# Patient Record
Sex: MALE | Race: WHITE | Employment: FULL TIME | URBAN - NONMETROPOLITAN AREA
[De-identification: names, ages, dates, MRNs, and addresses within clinical notes are randomized per-mention and may not be internally consistent; named-entity substitution may affect disease eponyms.]

---

## 2019-12-06 ENCOUNTER — HOSPITAL ENCOUNTER (EMERGENCY)
Age: 24
Discharge: HOME | End: 2019-12-06
Attending: FAMILY MEDICINE
Payer: COMMERCIAL

## 2019-12-06 ENCOUNTER — APPOINTMENT (OUTPATIENT)
Dept: GENERAL RADIOLOGY | Age: 24
End: 2019-12-06
Payer: COMMERCIAL

## 2019-12-06 VITALS
TEMPERATURE: 98.7 F | HEART RATE: 78 BPM | HEIGHT: 73 IN | RESPIRATION RATE: 18 BRPM | DIASTOLIC BLOOD PRESSURE: 80 MMHG | WEIGHT: 205 LBS | BODY MASS INDEX: 27.17 KG/M2 | OXYGEN SATURATION: 100 % | SYSTOLIC BLOOD PRESSURE: 130 MMHG

## 2019-12-06 DIAGNOSIS — S92.314A CLOSED NONDISPLACED FRACTURE OF FIRST METATARSAL BONE OF RIGHT FOOT, INITIAL ENCOUNTER: Primary | ICD-10-CM

## 2019-12-06 PROCEDURE — 99283 EMERGENCY DEPT VISIT LOW MDM: CPT

## 2019-12-06 PROCEDURE — 2709999900 HC NON-CHARGEABLE SUPPLY

## 2019-12-06 PROCEDURE — 73630 X-RAY EXAM OF FOOT: CPT

## 2019-12-06 PROCEDURE — 6370000000 HC RX 637 (ALT 250 FOR IP): Performed by: FAMILY MEDICINE

## 2019-12-06 PROCEDURE — 29515 APPLICATION SHORT LEG SPLINT: CPT

## 2019-12-06 RX ORDER — HYDROCODONE BITARTRATE AND ACETAMINOPHEN 5; 325 MG/1; MG/1
1 TABLET ORAL EVERY 6 HOURS PRN
Qty: 15 TABLET | Refills: 0 | Status: SHIPPED | OUTPATIENT
Start: 2019-12-06 | End: 2019-12-09

## 2019-12-06 RX ORDER — HYDROCODONE BITARTRATE AND ACETAMINOPHEN 5; 325 MG/1; MG/1
1 TABLET ORAL ONCE
Status: COMPLETED | OUTPATIENT
Start: 2019-12-06 | End: 2019-12-06

## 2019-12-06 RX ADMIN — HYDROCODONE BITARTRATE AND ACETAMINOPHEN 1 TABLET: 5; 325 TABLET ORAL at 15:37

## 2019-12-06 ASSESSMENT — ENCOUNTER SYMPTOMS
BACK PAIN: 0
ABDOMINAL PAIN: 0
WHEEZING: 0
SHORTNESS OF BREATH: 0
SORE THROAT: 0
COLOR CHANGE: 1
DIARRHEA: 0
COUGH: 0
NAUSEA: 0
VOMITING: 0

## 2019-12-06 ASSESSMENT — PAIN SCALES - GENERAL
PAINLEVEL_OUTOF10: 8
PAINLEVEL_OUTOF10: 8

## 2019-12-06 ASSESSMENT — PAIN DESCRIPTION - PAIN TYPE: TYPE: ACUTE PAIN

## 2019-12-06 ASSESSMENT — PAIN DESCRIPTION - ORIENTATION: ORIENTATION: RIGHT

## 2019-12-06 ASSESSMENT — PAIN DESCRIPTION - LOCATION: LOCATION: FOOT

## 2019-12-17 ENCOUNTER — HOSPITAL ENCOUNTER (OUTPATIENT)
Dept: GENERAL RADIOLOGY | Age: 24
Discharge: HOME | End: 2019-12-17
Payer: COMMERCIAL

## 2019-12-17 ENCOUNTER — HOSPITAL ENCOUNTER (OUTPATIENT)
Age: 24
Discharge: HOME | End: 2019-12-17
Payer: COMMERCIAL

## 2019-12-17 DIAGNOSIS — M79.671 RIGHT FOOT PAIN: ICD-10-CM

## 2019-12-17 PROCEDURE — 73630 X-RAY EXAM OF FOOT: CPT

## 2025-06-18 ENCOUNTER — ANESTHESIA (OUTPATIENT)
Dept: OPERATING ROOM | Age: 30
End: 2025-06-18
Payer: COMMERCIAL

## 2025-06-18 ENCOUNTER — HOSPITAL ENCOUNTER (INPATIENT)
Age: 30
LOS: 3 days | Discharge: HOME | End: 2025-06-21
Attending: INTERNAL MEDICINE | Admitting: NURSE PRACTITIONER
Payer: COMMERCIAL

## 2025-06-18 ENCOUNTER — APPOINTMENT (OUTPATIENT)
Dept: CT IMAGING | Age: 30
End: 2025-06-18
Payer: COMMERCIAL

## 2025-06-18 ENCOUNTER — APPOINTMENT (OUTPATIENT)
Dept: GENERAL RADIOLOGY | Age: 30
End: 2025-06-18
Payer: COMMERCIAL

## 2025-06-18 ENCOUNTER — ANESTHESIA EVENT (OUTPATIENT)
Dept: OPERATING ROOM | Age: 30
End: 2025-06-18
Payer: COMMERCIAL

## 2025-06-18 DIAGNOSIS — J36 TONSIL, ABSCESS: ICD-10-CM

## 2025-06-18 DIAGNOSIS — L02.11 ABSCESS OF MULTIPLE SITES OF HEAD AND NECK: ICD-10-CM

## 2025-06-18 DIAGNOSIS — L02.811 ABSCESS OF MULTIPLE SITES OF HEAD AND NECK: ICD-10-CM

## 2025-06-18 DIAGNOSIS — J36 TONSILLAR ABSCESS: Primary | ICD-10-CM

## 2025-06-18 PROBLEM — Z99.11: Status: ACTIVE | Noted: 2025-06-18

## 2025-06-18 LAB
ANION GAP SERPL CALC-SCNC: 14 MEQ/L (ref 8–16)
APTT PPP: 33.1 SECONDS (ref 22–38)
BASOPHILS ABSOLUTE: 0 THOU/MM3 (ref 0–0.1)
BASOPHILS NFR BLD AUTO: 0.2 %
BUN SERPL-MCNC: 12 MG/DL (ref 8–23)
CALCIUM SERPL-MCNC: 10.1 MG/DL (ref 8.6–10)
CHLORIDE SERPL-SCNC: 101 MEQ/L (ref 98–111)
CO2 SERPL-SCNC: 23 MEQ/L (ref 22–29)
CREAT SERPL-MCNC: 0.7 MG/DL (ref 0.7–1.2)
CRP SERPL-MCNC: 11 MG/DL (ref 0–0.5)
CRP SERPL-MCNC: 9.32 MG/DL (ref 0–0.5)
DEPRECATED RDW RBC AUTO: 41.1 FL (ref 35–45)
EKG ATRIAL RATE: 92 BPM
EKG P AXIS: 77 DEGREES
EKG P-R INTERVAL: 130 MS
EKG Q-T INTERVAL: 332 MS
EKG QRS DURATION: 86 MS
EKG QTC CALCULATION (BAZETT): 410 MS
EKG R AXIS: 58 DEGREES
EKG T AXIS: 32 DEGREES
EKG VENTRICULAR RATE: 92 BPM
EOSINOPHIL NFR BLD AUTO: 0.2 %
EOSINOPHILS ABSOLUTE: 0 THOU/MM3 (ref 0–0.4)
ERYTHROCYTE [DISTWIDTH] IN BLOOD BY AUTOMATED COUNT: 12.4 % (ref 11.5–14.5)
ERYTHROCYTE [SEDIMENTATION RATE] IN BLOOD BY WESTERGREN METHOD: 37 MM/HR (ref 0–20)
GFR SERPL CREATININE-BSD FRML MDRD: > 90 ML/MIN/1.73M2
GLUCOSE SERPL-MCNC: 96 MG/DL (ref 74–109)
HCT VFR BLD AUTO: 42.7 % (ref 42–52)
HGB BLD-MCNC: 15 GM/DL (ref 14–18)
IMM GRANULOCYTES # BLD AUTO: 0.03 THOU/MM3 (ref 0–0.07)
IMM GRANULOCYTES NFR BLD AUTO: 0.3 %
LACTIC ACID, SEPSIS: 0.6 MMOL/L (ref 0.5–1.9)
LACTIC ACID, SEPSIS: 0.8 MMOL/L (ref 0.5–1.9)
LYMPHOCYTES ABSOLUTE: 1.7 THOU/MM3 (ref 1–4.8)
LYMPHOCYTES NFR BLD AUTO: 18.1 %
MCH RBC QN AUTO: 32.1 PG (ref 26–33)
MCHC RBC AUTO-ENTMCNC: 35.1 GM/DL (ref 32.2–35.5)
MCV RBC AUTO: 91.4 FL (ref 80–94)
MONOCYTES ABSOLUTE: 0.6 THOU/MM3 (ref 0.4–1.3)
MONOCYTES NFR BLD AUTO: 6.9 %
MONONUCLEOSIS ANTIBODY: NEGATIVE
NEUTROPHILS ABSOLUTE: 6.9 THOU/MM3 (ref 1.8–7.7)
NEUTROPHILS NFR BLD AUTO: 74.3 %
NRBC BLD AUTO-RTO: 0 /100 WBC
PLATELET # BLD AUTO: 184 THOU/MM3 (ref 130–400)
PMV BLD AUTO: 10.2 FL (ref 9.4–12.4)
POTASSIUM SERPL-SCNC: 4 MEQ/L (ref 3.5–5.2)
PROCALCITONIN SERPL IA-MCNC: 0.05 NG/ML (ref 0.01–0.09)
PROCALCITONIN SERPL IA-MCNC: 0.06 NG/ML (ref 0.01–0.09)
RBC # BLD AUTO: 4.67 MILL/MM3 (ref 4.7–6.1)
S PYO AG THROAT QL: NEGATIVE
SODIUM SERPL-SCNC: 138 MEQ/L (ref 135–145)
WBC # BLD AUTO: 9.3 THOU/MM3 (ref 4.8–10.8)

## 2025-06-18 PROCEDURE — 93010 ELECTROCARDIOGRAM REPORT: CPT | Performed by: INTERNAL MEDICINE

## 2025-06-18 PROCEDURE — 2580000003 HC RX 258: Performed by: REGISTERED NURSE

## 2025-06-18 PROCEDURE — 86140 C-REACTIVE PROTEIN: CPT

## 2025-06-18 PROCEDURE — C1601 HC ENDOSCOPE, PULM, IMAGING/ILLUMINATION DEVICE: HCPCS | Performed by: OTOLARYNGOLOGY

## 2025-06-18 PROCEDURE — 85651 RBC SED RATE NONAUTOMATED: CPT

## 2025-06-18 PROCEDURE — 2709999900 HC NON-CHARGEABLE SUPPLY: Performed by: OTOLARYNGOLOGY

## 2025-06-18 PROCEDURE — 3700000001 HC ADD 15 MINUTES (ANESTHESIA): Performed by: OTOLARYNGOLOGY

## 2025-06-18 PROCEDURE — 2500000003 HC RX 250 WO HCPCS: Performed by: REGISTERED NURSE

## 2025-06-18 PROCEDURE — 85730 THROMBOPLASTIN TIME PARTIAL: CPT

## 2025-06-18 PROCEDURE — 99285 EMERGENCY DEPT VISIT HI MDM: CPT

## 2025-06-18 PROCEDURE — 3600000002 HC SURGERY LEVEL 2 BASE: Performed by: OTOLARYNGOLOGY

## 2025-06-18 PROCEDURE — 2100000000 HC CCU R&B

## 2025-06-18 PROCEDURE — 7100000000 HC PACU RECOVERY - FIRST 15 MIN: Performed by: OTOLARYNGOLOGY

## 2025-06-18 PROCEDURE — 6360000002 HC RX W HCPCS: Performed by: INTERNAL MEDICINE

## 2025-06-18 PROCEDURE — 0C9P3ZZ DRAINAGE OF TONSILS, PERCUTANEOUS APPROACH: ICD-10-PCS | Performed by: OTOLARYNGOLOGY

## 2025-06-18 PROCEDURE — 42720 I&D ABSC PHRNGL NTRAORL APPR: CPT | Performed by: OTOLARYNGOLOGY

## 2025-06-18 PROCEDURE — 6360000002 HC RX W HCPCS

## 2025-06-18 PROCEDURE — 6370000000 HC RX 637 (ALT 250 FOR IP): Performed by: OTOLARYNGOLOGY

## 2025-06-18 PROCEDURE — 87070 CULTURE OTHR SPECIMN AEROBIC: CPT

## 2025-06-18 PROCEDURE — 2500000003 HC RX 250 WO HCPCS: Performed by: INTERNAL MEDICINE

## 2025-06-18 PROCEDURE — 87651 STREP A DNA AMP PROBE: CPT

## 2025-06-18 PROCEDURE — 7100000001 HC PACU RECOVERY - ADDTL 15 MIN: Performed by: OTOLARYNGOLOGY

## 2025-06-18 PROCEDURE — 0B9M8ZX DRAINAGE OF BILATERAL LUNGS, VIA NATURAL OR ARTIFICIAL OPENING ENDOSCOPIC, DIAGNOSTIC: ICD-10-PCS | Performed by: OTOLARYNGOLOGY

## 2025-06-18 PROCEDURE — 3600000012 HC SURGERY LEVEL 2 ADDTL 15MIN: Performed by: OTOLARYNGOLOGY

## 2025-06-18 PROCEDURE — 71045 X-RAY EXAM CHEST 1 VIEW: CPT

## 2025-06-18 PROCEDURE — 6360000002 HC RX W HCPCS: Performed by: REGISTERED NURSE

## 2025-06-18 PROCEDURE — 2580000003 HC RX 258: Performed by: INTERNAL MEDICINE

## 2025-06-18 PROCEDURE — 36415 COLL VENOUS BLD VENIPUNCTURE: CPT

## 2025-06-18 PROCEDURE — 87075 CULTR BACTERIA EXCEPT BLOOD: CPT

## 2025-06-18 PROCEDURE — 70491 CT SOFT TISSUE NECK W/DYE: CPT

## 2025-06-18 PROCEDURE — 99205 OFFICE O/P NEW HI 60 MIN: CPT

## 2025-06-18 PROCEDURE — 87040 BLOOD CULTURE FOR BACTERIA: CPT

## 2025-06-18 PROCEDURE — 86308 HETEROPHILE ANTIBODY SCREEN: CPT

## 2025-06-18 PROCEDURE — 2580000003 HC RX 258: Performed by: OTOLARYNGOLOGY

## 2025-06-18 PROCEDURE — 3700000000 HC ANESTHESIA ATTENDED CARE: Performed by: OTOLARYNGOLOGY

## 2025-06-18 PROCEDURE — 6360000002 HC RX W HCPCS: Performed by: ANESTHESIOLOGY

## 2025-06-18 PROCEDURE — 83605 ASSAY OF LACTIC ACID: CPT

## 2025-06-18 PROCEDURE — 93005 ELECTROCARDIOGRAM TRACING: CPT | Performed by: INTERNAL MEDICINE

## 2025-06-18 PROCEDURE — 80048 BASIC METABOLIC PNL TOTAL CA: CPT

## 2025-06-18 PROCEDURE — 99222 1ST HOSP IP/OBS MODERATE 55: CPT | Performed by: INTERNAL MEDICINE

## 2025-06-18 PROCEDURE — 99205 OFFICE O/P NEW HI 60 MIN: CPT | Performed by: EMERGENCY MEDICINE

## 2025-06-18 PROCEDURE — 6360000004 HC RX CONTRAST MEDICATION: Performed by: EMERGENCY MEDICINE

## 2025-06-18 PROCEDURE — 84145 PROCALCITONIN (PCT): CPT

## 2025-06-18 PROCEDURE — 6360000002 HC RX W HCPCS: Performed by: OTOLARYNGOLOGY

## 2025-06-18 PROCEDURE — 31624 DX BRONCHOSCOPE/LAVAGE: CPT | Performed by: OTOLARYNGOLOGY

## 2025-06-18 PROCEDURE — 85025 COMPLETE CBC W/AUTO DIFF WBC: CPT

## 2025-06-18 PROCEDURE — 87205 SMEAR GRAM STAIN: CPT

## 2025-06-18 PROCEDURE — 87077 CULTURE AEROBIC IDENTIFY: CPT

## 2025-06-18 RX ORDER — SODIUM CHLORIDE, SODIUM LACTATE, POTASSIUM CHLORIDE, CALCIUM CHLORIDE 600; 310; 30; 20 MG/100ML; MG/100ML; MG/100ML; MG/100ML
INJECTION, SOLUTION INTRAVENOUS
Status: DISCONTINUED | OUTPATIENT
Start: 2025-06-18 | End: 2025-06-18 | Stop reason: SDUPTHER

## 2025-06-18 RX ORDER — IOPAMIDOL 755 MG/ML
85 INJECTION, SOLUTION INTRAVASCULAR
Status: COMPLETED | OUTPATIENT
Start: 2025-06-18 | End: 2025-06-18

## 2025-06-18 RX ORDER — LIDOCAINE HYDROCHLORIDE 20 MG/ML
INJECTION, SOLUTION INFILTRATION; PERINEURAL
Status: DISCONTINUED | OUTPATIENT
Start: 2025-06-18 | End: 2025-06-18 | Stop reason: SDUPTHER

## 2025-06-18 RX ORDER — ROCURONIUM BROMIDE 10 MG/ML
INJECTION, SOLUTION INTRAVENOUS
Status: DISCONTINUED | OUTPATIENT
Start: 2025-06-18 | End: 2025-06-18 | Stop reason: SDUPTHER

## 2025-06-18 RX ORDER — POLYETHYLENE GLYCOL 3350 17 G/17G
17 POWDER, FOR SOLUTION ORAL DAILY PRN
Status: DISCONTINUED | OUTPATIENT
Start: 2025-06-18 | End: 2025-06-21 | Stop reason: HOSPADM

## 2025-06-18 RX ORDER — SUCCINYLCHOLINE CHLORIDE 20 MG/ML
INJECTION INTRAMUSCULAR; INTRAVENOUS
Status: DISCONTINUED | OUTPATIENT
Start: 2025-06-18 | End: 2025-06-18 | Stop reason: SDUPTHER

## 2025-06-18 RX ORDER — ACETAMINOPHEN 325 MG/1
650 TABLET ORAL EVERY 6 HOURS PRN
Status: DISCONTINUED | OUTPATIENT
Start: 2025-06-18 | End: 2025-06-21 | Stop reason: HOSPADM

## 2025-06-18 RX ORDER — SODIUM CHLORIDE, SODIUM LACTATE, POTASSIUM CHLORIDE, CALCIUM CHLORIDE 600; 310; 30; 20 MG/100ML; MG/100ML; MG/100ML; MG/100ML
INJECTION, SOLUTION INTRAVENOUS CONTINUOUS
Status: DISCONTINUED | OUTPATIENT
Start: 2025-06-18 | End: 2025-06-20

## 2025-06-18 RX ORDER — SODIUM CHLORIDE 9 MG/ML
INJECTION, SOLUTION INTRAVENOUS PRN
Status: DISCONTINUED | OUTPATIENT
Start: 2025-06-18 | End: 2025-06-21 | Stop reason: HOSPADM

## 2025-06-18 RX ORDER — CHLORHEXIDINE GLUCONATE ORAL RINSE 1.2 MG/ML
15 SOLUTION DENTAL 4 TIMES DAILY
Status: DISCONTINUED | OUTPATIENT
Start: 2025-06-18 | End: 2025-06-21 | Stop reason: HOSPADM

## 2025-06-18 RX ORDER — POTASSIUM CHLORIDE 1500 MG/1
40 TABLET, EXTENDED RELEASE ORAL PRN
Status: DISCONTINUED | OUTPATIENT
Start: 2025-06-18 | End: 2025-06-21 | Stop reason: HOSPADM

## 2025-06-18 RX ORDER — FENTANYL CITRATE 50 UG/ML
INJECTION, SOLUTION INTRAMUSCULAR; INTRAVENOUS
Status: DISCONTINUED | OUTPATIENT
Start: 2025-06-18 | End: 2025-06-18 | Stop reason: SDUPTHER

## 2025-06-18 RX ORDER — MORPHINE SULFATE 2 MG/ML
2 INJECTION, SOLUTION INTRAMUSCULAR; INTRAVENOUS EVERY 4 HOURS PRN
Status: DISCONTINUED | OUTPATIENT
Start: 2025-06-18 | End: 2025-06-20

## 2025-06-18 RX ORDER — ONDANSETRON 2 MG/ML
4 INJECTION INTRAMUSCULAR; INTRAVENOUS EVERY 6 HOURS PRN
Status: DISCONTINUED | OUTPATIENT
Start: 2025-06-18 | End: 2025-06-21 | Stop reason: HOSPADM

## 2025-06-18 RX ORDER — POTASSIUM CHLORIDE 7.45 MG/ML
10 INJECTION INTRAVENOUS PRN
Status: DISCONTINUED | OUTPATIENT
Start: 2025-06-18 | End: 2025-06-21 | Stop reason: HOSPADM

## 2025-06-18 RX ORDER — PROPOFOL 10 MG/ML
INJECTION, EMULSION INTRAVENOUS
Status: DISCONTINUED | OUTPATIENT
Start: 2025-06-18 | End: 2025-06-18 | Stop reason: SDUPTHER

## 2025-06-18 RX ORDER — SODIUM CHLORIDE 0.9 % (FLUSH) 0.9 %
5-40 SYRINGE (ML) INJECTION PRN
Status: DISCONTINUED | OUTPATIENT
Start: 2025-06-18 | End: 2025-06-21 | Stop reason: HOSPADM

## 2025-06-18 RX ORDER — ACETAMINOPHEN 650 MG/1
650 SUPPOSITORY RECTAL EVERY 6 HOURS PRN
Status: DISCONTINUED | OUTPATIENT
Start: 2025-06-18 | End: 2025-06-21 | Stop reason: HOSPADM

## 2025-06-18 RX ORDER — ONDANSETRON 4 MG/1
4 TABLET, ORALLY DISINTEGRATING ORAL EVERY 8 HOURS PRN
Status: DISCONTINUED | OUTPATIENT
Start: 2025-06-18 | End: 2025-06-21 | Stop reason: HOSPADM

## 2025-06-18 RX ORDER — MAGNESIUM SULFATE IN WATER 40 MG/ML
2000 INJECTION, SOLUTION INTRAVENOUS PRN
Status: DISCONTINUED | OUTPATIENT
Start: 2025-06-18 | End: 2025-06-21 | Stop reason: HOSPADM

## 2025-06-18 RX ORDER — SODIUM CHLORIDE 0.9 % (FLUSH) 0.9 %
5-40 SYRINGE (ML) INJECTION EVERY 12 HOURS SCHEDULED
Status: DISCONTINUED | OUTPATIENT
Start: 2025-06-18 | End: 2025-06-21 | Stop reason: HOSPADM

## 2025-06-18 RX ORDER — MIDAZOLAM HYDROCHLORIDE 1 MG/ML
INJECTION, SOLUTION INTRAMUSCULAR; INTRAVENOUS
Status: DISCONTINUED | OUTPATIENT
Start: 2025-06-18 | End: 2025-06-18 | Stop reason: SDUPTHER

## 2025-06-18 RX ADMIN — SODIUM CHLORIDE 6 MCG: 9 INJECTION, SOLUTION INTRAVENOUS at 17:57

## 2025-06-18 RX ADMIN — IOPAMIDOL 85 ML: 755 INJECTION, SOLUTION INTRAVENOUS at 12:53

## 2025-06-18 RX ADMIN — SUGAMMADEX 200 MG: 100 INJECTION, SOLUTION INTRAVENOUS at 18:05

## 2025-06-18 RX ADMIN — SODIUM CHLORIDE, POTASSIUM CHLORIDE, SODIUM LACTATE AND CALCIUM CHLORIDE: 600; 310; 30; 20 INJECTION, SOLUTION INTRAVENOUS at 17:17

## 2025-06-18 RX ADMIN — MIDAZOLAM 2 MG: 1 INJECTION INTRAMUSCULAR; INTRAVENOUS at 17:17

## 2025-06-18 RX ADMIN — SODIUM CHLORIDE 6 MCG: 9 INJECTION, SOLUTION INTRAVENOUS at 17:47

## 2025-06-18 RX ADMIN — FENTANYL CITRATE 50 MCG: 50 INJECTION, SOLUTION INTRAMUSCULAR; INTRAVENOUS at 17:48

## 2025-06-18 RX ADMIN — ROCURONIUM BROMIDE 5 MG: 10 INJECTION INTRAVENOUS at 17:22

## 2025-06-18 RX ADMIN — Medication 140 MG: at 17:22

## 2025-06-18 RX ADMIN — PIPERACILLIN SODIUM AND TAZOBACTAM SODIUM 3375 MG: 3; .375 INJECTION, POWDER, LYOPHILIZED, FOR SOLUTION INTRAVENOUS at 17:28

## 2025-06-18 RX ADMIN — 0.12% CHLORHEXIDINE GLUCONATE 15 ML: 1.2 RINSE ORAL at 20:01

## 2025-06-18 RX ADMIN — FENTANYL CITRATE 50 MCG: 50 INJECTION, SOLUTION INTRAMUSCULAR; INTRAVENOUS at 17:22

## 2025-06-18 RX ADMIN — ROCURONIUM BROMIDE 45 MG: 10 INJECTION INTRAVENOUS at 17:34

## 2025-06-18 RX ADMIN — PROPOFOL 100 MG: 10 INJECTION, EMULSION INTRAVENOUS at 17:28

## 2025-06-18 RX ADMIN — LIDOCAINE HYDROCHLORIDE 50 MG: 20 INJECTION, SOLUTION INFILTRATION; PERINEURAL at 17:22

## 2025-06-18 RX ADMIN — HYDROMORPHONE HYDROCHLORIDE 1 MG: 1 INJECTION, SOLUTION INTRAMUSCULAR; INTRAVENOUS; SUBCUTANEOUS at 21:43

## 2025-06-18 RX ADMIN — VANCOMYCIN HYDROCHLORIDE 2250 MG: 1 INJECTION, POWDER, LYOPHILIZED, FOR SOLUTION INTRAVENOUS at 21:10

## 2025-06-18 RX ADMIN — HYDROMORPHONE HYDROCHLORIDE 0.5 MG: 1 INJECTION, SOLUTION INTRAMUSCULAR; INTRAVENOUS; SUBCUTANEOUS at 20:01

## 2025-06-18 RX ADMIN — HYDROMORPHONE HYDROCHLORIDE 0.5 MG: 1 INJECTION, SOLUTION INTRAMUSCULAR; INTRAVENOUS; SUBCUTANEOUS at 18:46

## 2025-06-18 RX ADMIN — SODIUM CHLORIDE, SODIUM LACTATE, POTASSIUM CHLORIDE, AND CALCIUM CHLORIDE: .6; .31; .03; .02 INJECTION, SOLUTION INTRAVENOUS at 15:42

## 2025-06-18 RX ADMIN — PROPOFOL 200 MG: 10 INJECTION, EMULSION INTRAVENOUS at 17:22

## 2025-06-18 RX ADMIN — HYDROMORPHONE HYDROCHLORIDE 0.5 MG: 1 INJECTION, SOLUTION INTRAMUSCULAR; INTRAVENOUS; SUBCUTANEOUS at 18:40

## 2025-06-18 RX ADMIN — SODIUM CHLORIDE 8 MCG: 9 INJECTION, SOLUTION INTRAVENOUS at 17:37

## 2025-06-18 RX ADMIN — SODIUM CHLORIDE, PRESERVATIVE FREE 10 ML: 5 INJECTION INTRAVENOUS at 20:03

## 2025-06-18 RX ADMIN — SODIUM CHLORIDE, SODIUM LACTATE, POTASSIUM CHLORIDE, AND CALCIUM CHLORIDE: .6; .31; .03; .02 INJECTION, SOLUTION INTRAVENOUS at 21:07

## 2025-06-18 ASSESSMENT — PAIN DESCRIPTION - ONSET: ONSET: ON-GOING

## 2025-06-18 ASSESSMENT — PAIN DESCRIPTION - DESCRIPTORS
DESCRIPTORS: ACHING;SORE
DESCRIPTORS: SHARP

## 2025-06-18 ASSESSMENT — PAIN SCALES - GENERAL
PAINLEVEL_OUTOF10: 6
PAINLEVEL_OUTOF10: 5
PAINLEVEL_OUTOF10: 7

## 2025-06-18 ASSESSMENT — PAIN DESCRIPTION - FREQUENCY
FREQUENCY: CONTINUOUS
FREQUENCY: INTERMITTENT

## 2025-06-18 ASSESSMENT — ENCOUNTER SYMPTOMS
BACK PAIN: 0
SORE THROAT: 1
COUGH: 0
SHORTNESS OF BREATH: 0
TROUBLE SWALLOWING: 1
VOICE CHANGE: 0

## 2025-06-18 ASSESSMENT — PAIN DESCRIPTION - PAIN TYPE
TYPE: ACUTE PAIN
TYPE: SURGICAL PAIN

## 2025-06-18 ASSESSMENT — PAIN - FUNCTIONAL ASSESSMENT
PAIN_FUNCTIONAL_ASSESSMENT: 0-10
PAIN_FUNCTIONAL_ASSESSMENT: ACTIVITIES ARE NOT PREVENTED
PAIN_FUNCTIONAL_ASSESSMENT: NONE - DENIES PAIN
PAIN_FUNCTIONAL_ASSESSMENT: ACTIVITIES ARE NOT PREVENTED
PAIN_FUNCTIONAL_ASSESSMENT: ACTIVITIES ARE NOT PREVENTED

## 2025-06-18 ASSESSMENT — PAIN DESCRIPTION - LOCATION
LOCATION: THROAT
LOCATION: ABDOMEN
LOCATION: NECK

## 2025-06-18 ASSESSMENT — PAIN DESCRIPTION - ORIENTATION
ORIENTATION: RIGHT

## 2025-06-18 ASSESSMENT — LIFESTYLE VARIABLES: SMOKING_STATUS: 1

## 2025-06-18 NOTE — ANESTHESIA PRE PROCEDURE
Department of Anesthesiology  Preprocedure Note       Name:  Johnny Sharma   Age:  29 y.o.  :  1995                                          MRN:  [id number]         Date:  2025      Surgeon: Surgeon(s):  Tanvir Gurrola MD    Procedure: Procedure(s):  I&D Tonsil Abscess    Medications prior to admission:   Prior to Admission medications    Medication Sig Start Date End Date Taking? Authorizing Provider   azithromycin (ZITHROMAX) 250 MG tablet Take 1 tablet by mouth See Admin Instructions for 5 days 500mg on day 1 followed by 250mg on days 2 - 5 25  Srinivas Adrian MD       Current medications:    Current Facility-Administered Medications   Medication Dose Route Frequency Provider Last Rate Last Admin    lactated ringers infusion   IntraVENous Continuous Marvin Membreno  mL/hr at 25 1542 New Bag at 25 1542    piperacillin-tazobactam (ZOSYN) 4,500 mg in sodium chloride 0.9 % 100 mL IVPB (addEASE)  4,500 mg IntraVENous Once Marvin Membreno DO        Followed by    piperacillin-tazobactam (ZOSYN) 3,375 mg in sodium chloride 0.9 % 50 mL IVPB (addEASE)  3,375 mg IntraVENous Q8H Marvin Membreno, DO        sodium chloride flush 0.9 % injection 5-40 mL  5-40 mL IntraVENous 2 times per day Marvin Membreno, DO        sodium chloride flush 0.9 % injection 5-40 mL  5-40 mL IntraVENous PRN Mravin Membreno, DO        0.9 % sodium chloride infusion   IntraVENous PRN Marvin Membreno, DO        potassium chloride (KLOR-CON M) extended release tablet 40 mEq  40 mEq Oral PRN Allenhurst, Marvin, DO        Or    potassium bicarb-citric acid (EFFER-K) effervescent tablet 40 mEq  40 mEq Oral PRN Allenhurst, Marvin, DO        Or    potassium chloride 10 mEq/100 mL IVPB (Peripheral Line)  10 mEq IntraVENous PRN Haseeb, Marvin, DO        magnesium sulfate 2000 mg in 50 mL IVPB premix  2,000 mg IntraVENous PRN Haseeb, Marvin, DO        ondansetron (ZOFRAN-ODT) disintegrating tablet 4 mg  4 mg

## 2025-06-18 NOTE — H&P
Hospitalist H+P      Patient:  Johnny Sharma    Unit/Bed:8A-04/004-A  YOB: 1995  MRN: [id number]   Acct: [id number]     PCP: Srinivas Adrian MD  Date of Admission: 6/18/2025        Assessment and Plan:        Right tonsillar abscess will do intraoperative cultures, currently CBC with differential along with BMP, PT, PTT, EKG, chest x-ray are all pending  Marijuana use    CC: Sore throat x intermittently for 3 months    HPI: 29-year-old white male from [facility] presents with progressive sore throat and dysphagia.  States has been unable to eat for couple days.  He states his sore throat has been on and off for the last 3 months he has been treated with cephalosporin and prednisone most recently the sore throat started 4 to 5 days ago, seen his PCP with negative strep and was treated with azithromycin.  He had a CT of his neck that showed a 3.4 cm tonsillar abscess with extension of edema and fluid collection inferiorly to the right piriform sinus    ROS (14 point review of systems completed.  Pertinent positives noted. Otherwise ROS is negative) : Negative except what is in the HPI    PMH:  Per HPI and History reviewed. No pertinent past medical history.  SHX:  History reviewed. No pertinent surgical history.  FHX: History reviewed. No pertinent family history.  SOCHX:   Social History     Socioeconomic History    Marital status: Single     Spouse name: None    Number of children: None    Years of education: None    Highest education level: None   Tobacco Use    Smoking status: Every Day     Current packs/day: 1.00     Types: Cigarettes    Smokeless tobacco: Never   Substance and Sexual Activity    Alcohol use: Yes    Drug use: Yes     Types: Marijuana (Weed)      Allergies: Patient has no known allergies.  Medications:     lactated ringers        piperacillin-tazobactam  3,375 mg IntraVENous Q8H     Prior to Admission medications    Medication Sig Start Date End Date Taking?

## 2025-06-18 NOTE — PROGRESS NOTES
Patient released in stable condition. Instructed to go directly to [facility] for direct admit. Instructed to remain NPO until seen by  provider. Patient verbalized understanding. Ambulated, per self, to private car.

## 2025-06-18 NOTE — PROGRESS NOTES
1817: Pt arrives to pacu, awakens to verbal stimuli. Pt on simple mask, respirations easy and unlabored. Suctioned small amount of bloody secretions orally. VSS    1820: Dr. Gurrola at bedside to assess patient, states he would like an x-ray and labs.    1825: Ice chips provided to patient, tolerated well    1830: X-ray at bedside    1840: Pt c/o pain 7/10, 0.5mg of dilaudid administered    1845: Phlebotomist at bedside    1846: No change in pain, 0.5mg of dilaudid given    1847: Pt meets criteria for discharge from pacu    1855: Report given to Charley    1905: Pt transported to [facility] in stable condition. VSS

## 2025-06-18 NOTE — H&P
Adapted from prior ENT note:    Right tonsillar abscess with edema and fluid accumulation extending inferiorly to efface right pyriform sinus    History reviewed. No pertinent past medical history.    History reviewed. No pertinent surgical history.    No Known Allergies    Current Facility-Administered Medications   Medication Dose Route Frequency Provider Last Rate Last Admin    lactated ringers infusion   IntraVENous Continuous Marvin Membreno  mL/hr at 06/18/25 1542 New Bag at 06/18/25 1542    piperacillin-tazobactam (ZOSYN) 4,500 mg in sodium chloride 0.9 % 100 mL IVPB (addEASE)  4,500 mg IntraVENous Once Marvin Membreno DO        Followed by    piperacillin-tazobactam (ZOSYN) 3,375 mg in sodium chloride 0.9 % 50 mL IVPB (addEASE)  3,375 mg IntraVENous Q8H Marvin Membreno DO           Current vitals  BP (!) 149/84   Pulse (!) 116   Temp 97.9 °F (36.6 °C) (Oral)   Resp 18   Ht 1.829 m (6')   Wt 88.5 kg (195 lb)   SpO2 98%   BMI 26.45 kg/m²     Proceed with original surgical plan:  Incision and drainage right tonsillar abscess    Electronically signed by MELISSA Quintero CNP on 6/18/2025 at 3:51 PM      -----------------------------------------  -----------------------------------------    Department of Otolaryngology  Consult Note    Reason for Consult:  Tonsillar abscess  Requesting Physician:  ER Provider/Hospitalist    CHIEF COMPLAINT:  Sore throat    History Obtained From:  patient, electronic medical record    HISTORY OF PRESENT ILLNESS:                The patient is a 29 y.o. male who was admitted to [facility] this afternoon for evaluation of progressive sore throat and dysphagia. Patient with sore throat intermittently for the last 3 months.  Was treated with cephalosporin and prednisone at that time.  Most recent sore throat started 4-5 days ago.  Seen with PCP 6/16/25; strep negative, treated with azithromycin.  On presentation today, he is afebrile.  No  with a thin enhancing rim. This  collection has an irregular shape. This is 3 mm deep to the mucosal surface  along its superior posterior margin, axial image 28. This enlarges the mucosal  surfaces of the oropharynx. There appears to be associated edema within the  uvula. There is edema in the mucosal surfaces extending inferiorly to the  piriform sinus. The right piriform sinus is effaced. There is edema extending  into the prevertebral space bilaterally. This extends to the lower neck. This is  seen on the sagittal images when assessing the soft tissue windows.    There is a BB marker in the right upper neck. This is just inferior to the  patient's tonsillar collection. The underlying skin appears normal. There is no  inflammation of the submandibular gland.    The soft tissues of the nasopharynx are normal. The soft tissues on the left of  the oropharynx appear normal. The epiglottis is normal. The oral cavity and  floor of mouth are normal.  The vocal cords and subglottic airway are within  appropriate limits.    The thyroid gland, submandibular glands and parotid glands are within acceptable  limits.    There are mildly enlarged lymph nodes in the right neck. None of these are  necrotic. These are slender. These are most consistent with reactive lymph  nodes. There is no upper mediastinal adenopathy.    There are calcified granulomas in the left lung apex. There are no suspicious  findings in the lung apices. No suspicious osseous lesions are present. There is  mild mucosal thickening along the floors the maxillary sinuses. There is no  layering fluid. The mastoid air cells are normally aerated. There are no gross  abnormalities in the brain parenchyma.     Impression:       Right tonsillar abscess with a maximum measurement of 3.4 cm. There is  associated fullness of the mucosa extending inferiorly to the level of the  piriform sinus. Superiorly, this involves the uvula. There is

## 2025-06-18 NOTE — PROGRESS NOTES
Cell phone and glasses are bagged and labeled with an inpatient sticker and hung on the iv pole of the inpatient bed

## 2025-06-18 NOTE — PROGRESS NOTES
[facility]   Pharmacy Pharmacokinetic Monitoring Service - Vancomycin     Johnny Sharma is a 29 y.o. male starting on vancomycin therapy for impending mediastinitis secondary to intratonsillar and peritonsillar abscess. Pharmacy consulted by Dr Gurrola for monitoring and adjustment.    Target Concentration: Goal AUC/HERACLIO 400-600 mg*hr/L    Additional Antimicrobials: Zosyn    Pertinent Laboratory Values:   Wt Readings from Last 1 Encounters:   06/18/25 88.5 kg (195 lb)     Temp Readings from Last 1 Encounters:   06/18/25 98.5 °F (36.9 °C) (Temporal)     Estimated Creatinine Clearance: 171 mL/min (based on SCr of 0.7 mg/dL).  Recent Labs     06/18/25  1501   CREATININE 0.7   BUN 12   WBC 9.3     Pertinent Cultures:  Date Source Results   6/18/25 Blood x2 pending   6/18/25 Tonsil swab pending   6/18/25 BAL fluid pending   6/18/25 Tonsil tissue pending   MRSA Nasal Swab: N/A. Non-respiratory infection.    Plan:  Dosing recommendations based on Bayesian software  Start vancomycin 2250 mg loading dose, followed by 1500 mg IV q12h for anticipated AUC of 414 and trough concentration of 10 at steady state  Renal labs as indicated   Vancomycin concentration ordered for 6/20/25 0600  Pharmacy will monitor renal function and adjust therapy as indicated    Thank you for the consult,  Janie Norwood, PharmD, BCPS 6/18/2025 7:17 PM

## 2025-06-18 NOTE — H&P
[facility]    Unit[facility]Bed[facility]8A-04/004-A  MRN: [id number]   PCP: Srinivas Adrian MD  Date of Admission: 6/18/2025    Assessment and Plan(All pulmonary edema, renal failure, PE, and respiratory failure diagnoses are acute in nature unless otherwise specified):        Complex extending right tonsillar abscess status post intraoperative I&D concerning for mediastinitis: Patient status post intraoperative I&D of the right tonsil extending down to the pharyngeal tissue down to the mediastinum concerning for mediastinitis. Patient did have abscess debris in the lungs which was bronchoscopied in the OR. Status post Vanco and Zosyn dose prior to procedure. Continue abx.   Blood cx pending.   BAL pending  Abscess cultures pending  Monitor for posterior compartment issues such as the 9th to 12th cranial nerves superiorly and the 10th cranial nerve more inferiorly.   Scheduled dilaudid for pain, PRN morphine.  Acute hypoxic failure (Resolved): Intubated and extubated for intraoperative procedure.  Smoker/Marijuana: Smokes 1/day for many years. Cessation advised. UDS pending    CC:  Sore throat x intermittently for 3 months   HPI: Per initial H/P \"29-year-old white male from [facility] presents with progressive sore throat and dysphagia. States has been unable to eat for couple days. He states his sore throat has been on and off for the last 3 months he has been treated with cephalosporin and prednisone most recently the sore throat started 4 to 5 days ago, seen his PCP with negative strep and was treated with azithromycin. He had a CT of his neck that showed a 3.4 cm tonsillar abscess with extension of edema and fluid collection inferiorly to the right piriform sinus\"     6/18: Patient admitted to the ICU.    ROS: Review of Systems   Constitutional:  Negative for chills and fever.   HENT:  Positive for sore throat. Negative for drooling.    Respiratory:  Negative for cough and shortness of breath.

## 2025-06-18 NOTE — ED TRIAGE NOTES
Patient to room with c/o sore, swollen throat beginning five days ago. States oral antibiotics started two days ago, without improvement.

## 2025-06-18 NOTE — ED PROVIDER NOTES
[facility] EMERGENCY DEPARTMENT  Urgent Care Encounter       CHIEF COMPLAINT       Chief Complaint   Patient presents with    Sore Throat    Oral Swelling     Throat       Nurses Notes reviewed and I agree except as noted in the HPI.  HISTORY OF PRESENT ILLNESS   Johnny JAYA Sharma is a 29 y.o. male who presents for complaints of sore throat, pain and difficulty swallowing.  Symptoms x 5 days.  He was evaluated 2 days ago by his primary care provider and was placed on Zithromax.  States symptoms have worsened and not improved.    HPI    REVIEW OF SYSTEMS     Review of Systems   Constitutional:  Positive for chills.   HENT:  Positive for sore throat and trouble swallowing. Negative for voice change.        PAST MEDICAL HISTORY   History reviewed. No pertinent past medical history.    SURGICALHISTORY     Patient  has no past surgical history on file.    CURRENT MEDICATIONS       Previous Medications    AZITHROMYCIN (ZITHROMAX) 250 MG TABLET    Take 1 tablet by mouth See Admin Instructions for 5 days 500mg on day 1 followed by 250mg on days 2 - 5       ALLERGIES     Patient is has no known allergies.    Patients   Immunization History   Administered Date(s) Administered    DTP/HiB [birth year], 03/19/1996    DTaP vaccine 11/19/1996, 08/14/2000    Hep B, ENGERIX-B, RECOMBIVAX-HB, (age Birth - 19y), IM, 0.5mL 1995, 1995, 07/30/1996    MMR, PRIORIX, M-M-R II, (age 12m+), SC, 0.5mL 07/30/1996, 08/14/2000    Polio OPV 1995, 1995, 03/19/1996    Poliovirus, IPOL, (age 6w+), SC/IM, 0.5mL 08/14/2000    TDaP, ADACEL (age 10y-64y), BOOSTRIX (age 10y+), IM, 0.5mL 12/20/2017       FAMILY HISTORY     Patient's family history is not on file.    SOCIAL HISTORY     Patient  reports that he has been smoking cigarettes. He has never used smokeless tobacco. He reports current alcohol use. He reports current drug use. Drug: Marijuana (Weed).    PHYSICAL EXAM     ED TRIAGE VITALS

## 2025-06-18 NOTE — PLAN OF CARE
Problem: Discharge Planning  Goal: Discharge to home or other facility with appropriate resources  Outcome: Progressing  Flowsheets (Taken 6/18/2025 1528)  Discharge to home or other facility with appropriate resources:   Identify barriers to discharge with patient and caregiver   Identify discharge learning needs (meds, wound care, etc)   Arrange for needed discharge resources and transportation as appropriate   Refer to discharge planning if patient needs post-hospital services based on physician order or complex needs related to functional status, cognitive ability or social support system

## 2025-06-18 NOTE — PROCEDURES
PROCEDURE NOTE  Date: 6/18/2025   Name: Johnny LAKE Nevilletomcorin  YOB: 1995    Procedures  EKG completed

## 2025-06-19 PROBLEM — J39.0 PARAPHARYNGEAL ABSCESS: Status: ACTIVE | Noted: 2025-06-19

## 2025-06-19 LAB
AMPHETAMINES UR QL SCN: NEGATIVE
ANION GAP SERPL CALC-SCNC: 10 MEQ/L (ref 8–16)
BARBITURATES UR QL SCN: NEGATIVE
BASOPHILS ABSOLUTE: 0 THOU/MM3 (ref 0–0.1)
BASOPHILS NFR BLD AUTO: 0.3 %
BENZODIAZ UR QL SCN: POSITIVE
BUN SERPL-MCNC: 9 MG/DL (ref 8–23)
BUPRENORPHINE URINE: NEGATIVE
BZE UR QL SCN: NEGATIVE
CALCIUM SERPL-MCNC: 8.8 MG/DL (ref 8.6–10)
CANNABINOIDS UR QL SCN: POSITIVE
CHLORIDE SERPL-SCNC: 104 MEQ/L (ref 98–111)
CO2 SERPL-SCNC: 26 MEQ/L (ref 22–29)
CREAT SERPL-MCNC: 0.9 MG/DL (ref 0.7–1.2)
DEPRECATED RDW RBC AUTO: 42.6 FL (ref 35–45)
EKG ATRIAL RATE: 67 BPM
EKG P AXIS: 63 DEGREES
EKG P-R INTERVAL: 140 MS
EKG Q-T INTERVAL: 382 MS
EKG QRS DURATION: 94 MS
EKG QTC CALCULATION (BAZETT): 403 MS
EKG R AXIS: 50 DEGREES
EKG T AXIS: 46 DEGREES
EKG VENTRICULAR RATE: 67 BPM
EOSINOPHIL NFR BLD AUTO: 0.5 %
EOSINOPHILS ABSOLUTE: 0 THOU/MM3 (ref 0–0.4)
ERYTHROCYTE [DISTWIDTH] IN BLOOD BY AUTOMATED COUNT: 12.4 % (ref 11.5–14.5)
FENTANYL: POSITIVE
GFR SERPL CREATININE-BSD FRML MDRD: > 90 ML/MIN/1.73M2
GLUCOSE SERPL-MCNC: 91 MG/DL (ref 74–109)
HCT VFR BLD AUTO: 38.6 % (ref 42–52)
HGB BLD-MCNC: 13.1 GM/DL (ref 14–18)
IMM GRANULOCYTES # BLD AUTO: 0.03 THOU/MM3 (ref 0–0.07)
IMM GRANULOCYTES NFR BLD AUTO: 0.4 %
LYMPHOCYTES ABSOLUTE: 1.7 THOU/MM3 (ref 1–4.8)
LYMPHOCYTES NFR BLD AUTO: 22.6 %
MAGNESIUM SERPL-MCNC: 2.1 MG/DL (ref 1.6–2.6)
MCH RBC QN AUTO: 31.6 PG (ref 26–33)
MCHC RBC AUTO-ENTMCNC: 33.9 GM/DL (ref 32.2–35.5)
MCV RBC AUTO: 93.2 FL (ref 80–94)
MONOCYTES ABSOLUTE: 0.7 THOU/MM3 (ref 0.4–1.3)
MONOCYTES NFR BLD AUTO: 8.6 %
NEUTROPHILS ABSOLUTE: 5.2 THOU/MM3 (ref 1.8–7.7)
NEUTROPHILS NFR BLD AUTO: 67.6 %
NRBC BLD AUTO-RTO: 0 /100 WBC
OPIATES UR QL SCN: POSITIVE
OXYCODONE: NEGATIVE
PCP UR QL SCN: NEGATIVE
PLATELET # BLD AUTO: 147 THOU/MM3 (ref 130–400)
PMV BLD AUTO: 10.2 FL (ref 9.4–12.4)
POTASSIUM SERPL-SCNC: 4.3 MEQ/L (ref 3.5–5.2)
RBC # BLD AUTO: 4.14 MILL/MM3 (ref 4.7–6.1)
SODIUM SERPL-SCNC: 140 MEQ/L (ref 135–145)
WBC # BLD AUTO: 7.7 THOU/MM3 (ref 4.8–10.8)

## 2025-06-19 PROCEDURE — 80307 DRUG TEST PRSMV CHEM ANLYZR: CPT

## 2025-06-19 PROCEDURE — 2580000003 HC RX 258: Performed by: OTOLARYNGOLOGY

## 2025-06-19 PROCEDURE — 6360000002 HC RX W HCPCS: Performed by: OTOLARYNGOLOGY

## 2025-06-19 PROCEDURE — 80048 BASIC METABOLIC PNL TOTAL CA: CPT

## 2025-06-19 PROCEDURE — 93005 ELECTROCARDIOGRAM TRACING: CPT

## 2025-06-19 PROCEDURE — 99223 1ST HOSP IP/OBS HIGH 75: CPT | Performed by: STUDENT IN AN ORGANIZED HEALTH CARE EDUCATION/TRAINING PROGRAM

## 2025-06-19 PROCEDURE — 99024 POSTOP FOLLOW-UP VISIT: CPT | Performed by: NURSE PRACTITIONER

## 2025-06-19 PROCEDURE — 94669 MECHANICAL CHEST WALL OSCILL: CPT

## 2025-06-19 PROCEDURE — 99233 SBSQ HOSP IP/OBS HIGH 50: CPT | Performed by: INTERNAL MEDICINE

## 2025-06-19 PROCEDURE — 93010 ELECTROCARDIOGRAM REPORT: CPT | Performed by: INTERNAL MEDICINE

## 2025-06-19 PROCEDURE — 83735 ASSAY OF MAGNESIUM: CPT

## 2025-06-19 PROCEDURE — 36415 COLL VENOUS BLD VENIPUNCTURE: CPT

## 2025-06-19 PROCEDURE — 2060000000 HC ICU INTERMEDIATE R&B

## 2025-06-19 PROCEDURE — 6370000000 HC RX 637 (ALT 250 FOR IP): Performed by: OTOLARYNGOLOGY

## 2025-06-19 PROCEDURE — 2580000003 HC RX 258: Performed by: INTERNAL MEDICINE

## 2025-06-19 PROCEDURE — 6360000002 HC RX W HCPCS

## 2025-06-19 PROCEDURE — 85025 COMPLETE CBC W/AUTO DIFF WBC: CPT

## 2025-06-19 PROCEDURE — 94761 N-INVAS EAR/PLS OXIMETRY MLT: CPT

## 2025-06-19 RX ADMIN — SODIUM CHLORIDE, SODIUM LACTATE, POTASSIUM CHLORIDE, AND CALCIUM CHLORIDE: .6; .31; .03; .02 INJECTION, SOLUTION INTRAVENOUS at 22:46

## 2025-06-19 RX ADMIN — Medication 1500 MG: at 08:44

## 2025-06-19 RX ADMIN — Medication 1500 MG: at 21:25

## 2025-06-19 RX ADMIN — MORPHINE SULFATE 2 MG: 2 INJECTION, SOLUTION INTRAMUSCULAR; INTRAVENOUS at 01:12

## 2025-06-19 RX ADMIN — PIPERACILLIN AND TAZOBACTAM 3375 MG: 3; .375 INJECTION, POWDER, FOR SOLUTION INTRAVENOUS at 17:48

## 2025-06-19 RX ADMIN — 0.12% CHLORHEXIDINE GLUCONATE 15 ML: 1.2 RINSE ORAL at 14:07

## 2025-06-19 RX ADMIN — HYDROMORPHONE HYDROCHLORIDE 1 MG: 1 INJECTION, SOLUTION INTRAMUSCULAR; INTRAVENOUS; SUBCUTANEOUS at 10:21

## 2025-06-19 RX ADMIN — 0.12% CHLORHEXIDINE GLUCONATE 15 ML: 1.2 RINSE ORAL at 20:59

## 2025-06-19 RX ADMIN — MORPHINE SULFATE 2 MG: 2 INJECTION, SOLUTION INTRAMUSCULAR; INTRAVENOUS at 23:03

## 2025-06-19 RX ADMIN — PIPERACILLIN AND TAZOBACTAM 3375 MG: 3; .375 INJECTION, POWDER, FOR SOLUTION INTRAVENOUS at 01:17

## 2025-06-19 RX ADMIN — SODIUM CHLORIDE, SODIUM LACTATE, POTASSIUM CHLORIDE, AND CALCIUM CHLORIDE: .6; .31; .03; .02 INJECTION, SOLUTION INTRAVENOUS at 07:21

## 2025-06-19 RX ADMIN — HYDROMORPHONE HYDROCHLORIDE 1 MG: 1 INJECTION, SOLUTION INTRAMUSCULAR; INTRAVENOUS; SUBCUTANEOUS at 20:59

## 2025-06-19 RX ADMIN — HYDROMORPHONE HYDROCHLORIDE 1 MG: 1 INJECTION, SOLUTION INTRAMUSCULAR; INTRAVENOUS; SUBCUTANEOUS at 16:37

## 2025-06-19 RX ADMIN — PIPERACILLIN AND TAZOBACTAM 3375 MG: 3; .375 INJECTION, POWDER, FOR SOLUTION INTRAVENOUS at 10:27

## 2025-06-19 RX ADMIN — HYDROMORPHONE HYDROCHLORIDE 1 MG: 1 INJECTION, SOLUTION INTRAMUSCULAR; INTRAVENOUS; SUBCUTANEOUS at 04:23

## 2025-06-19 RX ADMIN — 0.12% CHLORHEXIDINE GLUCONATE 15 ML: 1.2 RINSE ORAL at 16:37

## 2025-06-19 RX ADMIN — 0.12% CHLORHEXIDINE GLUCONATE 15 ML: 1.2 RINSE ORAL at 08:40

## 2025-06-19 ASSESSMENT — PAIN DESCRIPTION - LOCATION
LOCATION: THROAT

## 2025-06-19 ASSESSMENT — PAIN SCALES - GENERAL
PAINLEVEL_OUTOF10: 6

## 2025-06-19 ASSESSMENT — ENCOUNTER SYMPTOMS
SHORTNESS OF BREATH: 0
SORE THROAT: 1
COUGH: 0
BACK PAIN: 0

## 2025-06-19 ASSESSMENT — PAIN DESCRIPTION - ORIENTATION
ORIENTATION: RIGHT
ORIENTATION: RIGHT;INNER

## 2025-06-19 ASSESSMENT — PAIN - FUNCTIONAL ASSESSMENT
PAIN_FUNCTIONAL_ASSESSMENT: ACTIVITIES ARE NOT PREVENTED

## 2025-06-19 ASSESSMENT — PAIN DESCRIPTION - ONSET: ONSET: ON-GOING

## 2025-06-19 ASSESSMENT — PAIN DESCRIPTION - PAIN TYPE: TYPE: SURGICAL PAIN

## 2025-06-19 ASSESSMENT — PAIN DESCRIPTION - DESCRIPTORS
DESCRIPTORS: ACHING
DESCRIPTORS: SHARP
DESCRIPTORS: ACHING
DESCRIPTORS: SHARP;SORE

## 2025-06-19 ASSESSMENT — PAIN DESCRIPTION - FREQUENCY: FREQUENCY: CONTINUOUS

## 2025-06-19 NOTE — PLAN OF CARE
Name: Johnny Sharma  YOB: 1995  MRN: [id number]  Date: 06/19/25     Plan of Care        PS sent to hospitalist. ENT consulted ID for recommendations.     Electronically signed by Ruebn Chang DO on 6/19/2025 at 11:39 AM

## 2025-06-19 NOTE — PROGRESS NOTES
Department of Otolaryngology  Progress Note    Chief Complaint:  POD 1 S/p I&D right tonsillar abscess (parapharyngeal abscess extending down to thoracic inlet) and intraoperative bronchoscopy with BAL (hemorrhagic pneumonia)    SUBJECTIVE:  Patient reports doing much better this morning.  He is taking ice chips and sips without difficulty; only a small area in the right side of throat that is sore when he swallows.  No breathing concerns.  WBC 7.7 today.  Afebrile. Cultures from surgery pending.    Initial HPI 6/18/25:  The patient is a 29 y.o. male who was admitted to [facility] this afternoon for evaluation of progressive sore throat and dysphagia. Patient with sore throat intermittently for the last 3 months.  Was treated with cephalosporin and prednisone at that time.  Most recent sore throat started 4-5 days ago.  Seen with PCP 6/16/25; strep negative, treated with azithromycin.  On presentation today, he is afebrile.  No labs available at this time due to lab processing down at [place] location.  He did have CT neck that shows 3.4cm right tonsillar abscess if largest dimension with extension of edema and fluid collection inferiorly to right pyriform sinus.     A complete multi-organ review of systems was performed using a new patient questionnaire, and reviewed by me.  ENT:  negative except as noted in HPI  CONSTITUTIONAL:  negative except as noted in HPI  EYES:  negative except as noted in HPI  RESPIRATORY:  negative except as noted in HPI  CARDIOVASCULAR:  negative except as noted in HPI  GASTROINTESTINAL:  negative except as noted in HPI  GENITOURINARY:  negative except as noted in HPI  MUSCULOSKELETAL:  negative except as noted in HPI  SKIN:  negative except as noted in HPI  ENDOCRINE/METABOLIC: negative except as noted in HPI  HEMATOLOGIC/LYMPHATIC:  negative except as noted in HPI  ALLERGY/IMMUN: negative except as noted in HPI  NEUROLOGICAL:  negative except as noted in  [facility]  Source: tonsil       Site: swabs abscess           Current Antibiotics: not stated   Specimen Collected: 06/18/25 17:56 EDT Last Resulted: 06/19/25 10:53 EDT       Radiology Review:    6/18/2025 1249 CT Soft Tissue Neck W Contrast  PROCEDURE: CT SOFT TISSUE NECK W CONTRAST    CLINICAL INFORMATION: right peritonsilar swelling, evaluate for abscess. Right  submandibular swelling. This began 5 days ago. Intermittent neck swelling for  the past few months.    COMPARISON: No prior study.    TECHNIQUE: 3 mm axial images were obtained through the neck soft tissues after  the administration of intravenous contrast. Sagittal and coronal reconstructions  were obtained.    All CT scans at this facility use dose modulation, iterative reconstruction,  and/or weight-based dosing when appropriate to reduce radiation dose to as low  as reasonably achievable.    FINDINGS:  In the oropharynx, there is significant enlargement of the right tonsil. Within  this there is a 3.4 x 1.1 x 3.3 cm collection with a thin enhancing rim. This  collection has an irregular shape. This is 3 mm deep to the mucosal surface  along its superior posterior margin, axial image 28. This enlarges the mucosal  surfaces of the oropharynx. There appears to be associated edema within the  uvula. There is edema in the mucosal surfaces extending inferiorly to the  piriform sinus. The right piriform sinus is effaced. There is edema extending  into the prevertebral space bilaterally. This extends to the lower neck. This is  seen on the sagittal images when assessing the soft tissue windows.    There is a BB marker in the right upper neck. This is just inferior to the  patient's tonsillar collection. The underlying skin appears normal. There is no  inflammation of the submandibular gland.    The soft tissues of the nasopharynx are normal. The soft tissues on the left of  the oropharynx appear normal. The epiglottis is normal. The oral cavity

## 2025-06-19 NOTE — PLAN OF CARE
Problem: Discharge Planning  Goal: Discharge to home or other facility with appropriate resources  6/19/2025 0957 by Eileen Merino RN  Outcome: Progressing     Problem: Pain  Goal: Verbalizes/displays adequate comfort level or baseline comfort level  6/19/2025 0957 by Eileen Merino, RN  Outcome: Progressing     Care plan reviewed with patient.  Patient verbalizes understanding of the plan of care and contributes to goal setting.

## 2025-06-19 NOTE — ANESTHESIA POSTPROCEDURE EVALUATION
Department of Anesthesiology  Postprocedure Note    Patient: Johnny Sharma  MRN: [id number]  YOB: 1995  Date of evaluation: 6/19/2025    Procedure Summary       Date: 06/18/25 Room / Location: [facility] OR 03 / [facility] OR    Anesthesia Start: 1717 Anesthesia Stop: 1824    Procedure: I&D Tonsil Abscess and intraop bronchochospy (Mouth) Diagnosis:       Tonsil, abscess      (Tonsil, abscess [J36])    Surgeons: Tanvir Gurrola MD Responsible Provider: Tim Daugherty MD    Anesthesia Type: General ASA Status: 2            Anesthesia Type: General    Kiana Phase I: Kiana Score: 10    Kiana Phase II:      Anesthesia Post Evaluation    Patient location during evaluation: PACU  Patient participation: complete - patient participated  Level of consciousness: awake and alert  Airway patency: patent  Nausea & Vomiting: no nausea and no vomiting  Cardiovascular status: hemodynamically stable  Respiratory status: acceptable  Hydration status: euvolemic  Pain management: adequate    [facility]  POST-ANESTHESIA NOTE       Name:  Johnny Sharma                                         Age:  30 y.o.  MRN:  [id number]      Last Vitals:  /71   Pulse 69   Temp 97.7 °F (36.5 °C) (Oral)   Resp 23   Ht 1.829 m (6')   Wt 88.5 kg (195 lb)   SpO2 93%   BMI 26.45 kg/m²   Patient Vitals for the past 4 hrs:   BP Pulse Resp SpO2   06/19/25 0330 113/71 69 23 --   06/19/25 0300 117/66 69 18 --   06/19/25 0230 105/70 89 15 --   06/19/25 0200 (!) 112/57 69 -- 93 %   06/19/25 0142 -- -- 14 --   06/19/25 0130 113/62 58 14 96 %   06/19/25 0112 -- -- 20 --   06/19/25 0100 110/65 75 16 93 %   06/19/25 0030 129/63 69 14 96 %   06/19/25 0020 106/61 59 15 96 %       Level of Consciousness:  Awake    Respiratory:  Stable    Oxygen Saturation:  Stable    Cardiovascular:  Stable    Hydration:  Adequate    PONV:  Stable    Post-op Pain:  Adequate analgesia    Post-op Assessment:  No apparent anesthetic

## 2025-06-19 NOTE — PROCEDURES
PROCEDURE NOTE  Date: 6/19/2025   Name: Johnny Sharma  YOB: 1995    Procedures        EKG was completed and handed to RN

## 2025-06-19 NOTE — PLAN OF CARE
Problem: Discharge Planning  Goal: Discharge to home or other facility with appropriate resources  6/19/2025 0448 by Charley Pham RN  Outcome: Progressing     Problem: Pain  Goal: Verbalizes/displays adequate comfort level or baseline comfort level  Outcome: Progressing     Care plan reviewed with patient.  Patient verbalizes understanding of the plan of care and contributes to goal setting.

## 2025-06-19 NOTE — PLAN OF CARE
Patient transferred out of the ICU initially to med/surge bed, followed by stepdown unit per ENT preference.  Patient status post I&D right tonsil for tonsillar abscess.  Patient with notable abscess debris in the lungs, bronc was performed in the OR.  Patient continues on Zosyn and vancomycin. Infectious disease consulted by ENT.  Repeat labs tomorrow. ENT to continue to follow along.  Hospitalist service to assume care as primary.    Electronically signed by Jean-Pierre Quezada PA-C on 6/19/2025 at 5:51 PM

## 2025-06-19 NOTE — PROGRESS NOTES
[facility]    [facility]:4B-04/004-A  MRN: [id number]   PCP: Srinivas Adrian MD  Date of Admission: 6/18/2025    Assessment and Plan(All pulmonary edema, renal failure, PE, and respiratory failure diagnoses are acute in nature unless otherwise specified):        Complex extending right tonsillar abscess status post intraoperative I&D concerning for mediastinitis: Patient status post intraoperative I&D of the right tonsil extending down to the pharyngeal tissue down to the mediastinum concerning for mediastinitis. Patient did have abscess debris in the lungs which was bronchoscopied in the OR. Status post Vanco and Zosyn dose prior to procedure. Continue abx.   Blood cx pending.   BAL pending  Abscess cultures pending  Monitor for posterior compartment issues such as the 9th to 12th cranial nerves superiorly and the 10th cranial nerve more inferiorly.   Scheduled dilaudid for pain, PRN morphine.  ENT planning on consulting Dr. Chakraborty.   Acute hypoxic failure (Resolved): Intubated and extubated for intraoperative procedure.  Polysubstance/Smoking/Marijuana abuse: Smokes 1/day for many years. Cessation advised. Patient denies any substance abuse. UDS unreliable as collected after delivery of pain medications.     CC:  Sore throat x intermittently for 3 months   HPI: Per initial H/P \"29-year-old white male from [facility] presents with progressive sore throat and dysphagia. States has been unable to eat for couple days. He states his sore throat has been on and off for the last 3 months he has been treated with cephalosporin and prednisone most recently the sore throat started 4 to 5 days ago, seen his PCP with negative strep and was treated with azithromycin. He had a CT of his neck that showed a 3.4 cm tonsillar abscess with extension of edema and fluid collection inferiorly to the right piriform sinus\"     6/18: Patient admitted to the ICU for closer monitoring. Patient seen and examined  key components of medical care.  Case discussed with resident physician.  Ok to transfer to medical service.  Italicized bold font, if present,  represents changes to the note made by me.  Time was discontiguous. Time does not include procedure. Time does include my direct assessment of the patient and coordination of care.  Time represents more than 50% of the time involved with patient care by the CC team.  Electronically signed by Arian Spencer MD.

## 2025-06-19 NOTE — CONSULTS
Hx and P/E :Infectious D.       Patient - Johnny Sharma,  Age - 30 y.o.    - 1995      Room Number - 4B-04/004-A   N -  [id number]   [id number]  Date of Admission -  2025 12:12 PM  Patient's PCP: Srinivas Adrian MD     Requesting Physician: Arian Spencer MD    CHIEF COMPLAINT:     Tonsillar abscess    ASSESSMENT AND PLAN     Pt is a 30yom who I am consulted for tonsillar abscess with extension towards thoracic inlet.     I have reviewed imaging and operative report.  Surgical intervention performed on  with ENT and ICU.  Bronchoscope demonstrated purulent secretions and blood in right mainstem which was suctioned.  There were evidence of erythema extending from neck.  Parapharyngeal abscess drained.  There is concern for possible mediastinitis based on distribution.  For now, I would recommend empiric coverage with vancomycin and piperacillin/tazobactam.  Multiple cultures obtained intraoperatively which remain in process.  I will add pneumonia panel onto bronchoscopy results if able.  I would recommend continuing IV therapy for the next 24 to 48 hours while awaiting cultures and to confirm no evidence of decompensation given suspicion for mediastinitis.  If any evidence of clinical worsening, recommend stat CT imaging for reassessment.  Infectious disease will continue to follow      HISTORY OF PRESENT ILLNESS       This is a very pleasant 30 y.o. male who was admitted to the hospital with a chief complaints of tonsillar abscess.  Infectious disease consulted for antibiotic recommendations and further evaluation.    Patient originally presented on  as a transfer due to sore throat and dysphagia.  He has had increasing difficulty with oral intake.  On arrival, CT scan obtained which demonstrated a 3.4 cm tonsillar abscess with fluid collection extending to piriform sinus. Operative intervention performed on  with evidence of abscess  extending to thoracic inlet with bal showing hemorrhagic purulent fluid w/in tracheobronchial tree in right lung. The erythema extended very near to thoracic inlet based on operative note review. Currently on vanc/zosyn with concern for possible mediastinitis.     On discussion with patient, he states that he feels significantly better today after surgical intervention.  He does note that he has had prior teeth removed though this was nearly 1 year prior.  He does note that for the past 2 to 3 months he has been having some issues with a sore throat for which he has received antimicrobials without significant improvement.  He has not had any other significant oral/throat related issues prior to this.    PAST MEDICAL AND SURGICAL HISTORY       History reviewed. No pertinent past medical history.    Past Surgical History:   Procedure Laterality Date    MOUTH SURGERY N/A 6/18/2025    I&D Tonsil Abscess and intraop bronchochospy performed by Tanvir Gurrola MD at [facility] OR         MEDICATIONS PRIOR TO ADMISSION:       Scheduled Meds:   sodium chloride flush  5-40 mL IntraVENous 2 times per day    chlorhexidine  15 mL Mouth/Throat 4x Daily    piperacillin-tazobactam  3,375 mg IntraVENous Q8H    vancomycin (VANCOCIN) intermittent dosing (placeholder)   Other RX Placeholder    vancomycin  1,500 mg IntraVENous Q12H    HYDROmorphone  1 mg IntraVENous Q6H     Continuous Infusions:   lactated ringers Stopped (06/19/25 1022)    sodium chloride       PRN Meds:sodium chloride flush, sodium chloride, potassium chloride **OR** potassium alternative oral replacement **OR** potassium chloride, magnesium sulfate, ondansetron **OR** ondansetron, polyethylene glycol, acetaminophen **OR** acetaminophen, morphine  Allergies:   ALLERGIES: Patient has no known allergies.           SOCIAL HISTORY:     TOBACCO:   reports that he has been smoking cigarettes. He has never used smokeless tobacco.     ETOH:   reports current alcohol

## 2025-06-19 NOTE — CARE COORDINATION
Case Management Assessment Initial Evaluation    Date/Time of Evaluation: 2025 10:19 AM  Assessment Completed by: Martha Junior RN    If patient is discharged prior to next notation, then this note serves as note for discharge by case management.    Patient Name: Johnny Sharma                   YOB: 1995  Diagnosis: Tonsillar abscess [J36]  Abscess of multiple sites of head and neck [L02.811, L02.11]                   Date / Time: 2025 12:12 PM  [facility]     Patient Admission Status: Inpatient   Readmission Risk Low 0-14, Mod 15-19), High > 20: Readmission Risk Score: 7.4    Current PCP: Srinivas Adrian MD  Health Care Decision Makers:   Primary Decision Maker: Flakita Sharma - [facility] - [phone]    Additional Case Management Notes: Presented to [facility] with c/o increased sore throat and dysphagia. Reports not able to eat for a couple days. Noted that was negative for strep at PCP. Found to have tonsillar abscess. Admitted to . ENT consulted. Taken to surgery yesterday with Dr Gurrola for I&D of right tonsillar abscess with extension down to pharyngeal tissue down to mediastinum concerning for mediastinitis. Bronch done during procedure to remove abscess debris that was noted in lungs. Admitted to ICU post-op. ID consulted today. Started on regular diet. Order to transfer to telemetry bed; awaiting bed assignment.     Afebrile. NSR. On room air. Ox4. Intensivist and ENT following. Telemetry, IS, SCDs. IVF, peridex, prn IV dilaudid, prn IV morphine, IV zosyn Q8H, IV vancomycin Q12H, Electrolyte replacement protocols. Bronch washings, blood cultures, and surgical cultures sent. CRP 9.32, hgb 13.1, sed rate 37.    Procedures:    I&D Tonsil Abscess and intraop bronchochospy with BAL     Imagin/18 CT Soft Tissue Neck: Right tonsillar abscess with a maximum measurement of 3.4 cm. There is associated fullness of the mucosa extending inferiorly to the

## 2025-06-19 NOTE — PROGRESS NOTES
Physician Progress Note      PATIENT:               SANDY CRAVEN  [facility] #:                  [id number]  :                       1995  ADMIT DATE:       2025 12:12 PM  DISCH DATE:  RESPONDING  PROVIDER #:        JOE WOLFE          QUERY TEXT:    Acute hypoxic failure is documented in the medical record in critical care   note . Based on your medical judgment, please clarify the reason for   intubation.    The clinical indicators include:  -per ED provider note  \"28 yo male who presents for complaints of sore   throat, pain and difficulty swallowing. Symptoms x5 days.\"  -per H&P note  \"Severe right tonsillitis with tonsillar abscess and fluid   collection extending inferiorly to efface right pyriform sinus\"    -per Critical care PN  \"Acute hypoxic failure (Resolved): Intubated and   extubated for intraoperative procedure.\"  -per Op note  \"While the incision and drainage and lavage was completed,   the patient was difficult to ventilate with poor respiratory dynamics   indicative of some sort of plugging process as though the patient was either   intubated in the mainstem or had secretions obstructing his right lung field.    As such I and the anesthetist changed his endotracheal tube over from a 6 to   a 7.5 using a bougie introducer without vital sign instability.  Once this   tube was in place and he was ventilating, his ventilatory numbers improved but   there was still a significant high pressure ten    -per op note  \"I&D Tonsil Abscess and intraop bronchochospy with BAL\"  Options provided:  -- Intubated for Acute Respiratory Failure as evidenced by, Please document   evidence.  -- Intubated for airway protection only, Acute Respiratory Failure ruled out   after study  -- Other - I will add my own diagnosis  -- Disagree - Not applicable / Not valid  -- Disagree - Clinically unable to determine / Unknown  -- Refer to Clinical Documentation Reviewer    PROVIDER

## 2025-06-20 LAB
ANION GAP SERPL CALC-SCNC: 11 MEQ/L (ref 8–16)
BACTERIA BLD AEROBE CULT: NORMAL
BACTERIA BLD AEROBE CULT: NORMAL
BASOPHILS ABSOLUTE: 0 THOU/MM3 (ref 0–0.1)
BASOPHILS NFR BLD AUTO: 0.7 %
BUN SERPL-MCNC: 9 MG/DL (ref 8–23)
CALCIUM SERPL-MCNC: 9.3 MG/DL (ref 8.6–10)
CHLORIDE SERPL-SCNC: 104 MEQ/L (ref 98–111)
CO2 SERPL-SCNC: 24 MEQ/L (ref 22–29)
CREAT SERPL-MCNC: 0.8 MG/DL (ref 0.7–1.2)
DEPRECATED RDW RBC AUTO: 42.4 FL (ref 35–45)
EOSINOPHIL NFR BLD AUTO: 2.8 %
EOSINOPHILS ABSOLUTE: 0.1 THOU/MM3 (ref 0–0.4)
ERYTHROCYTE [DISTWIDTH] IN BLOOD BY AUTOMATED COUNT: 12.2 % (ref 11.5–14.5)
GFR SERPL CREATININE-BSD FRML MDRD: > 90 ML/MIN/1.73M2
GLUCOSE SERPL-MCNC: 83 MG/DL (ref 74–109)
HCT VFR BLD AUTO: 39.6 % (ref 42–52)
HGB BLD-MCNC: 13.4 GM/DL (ref 14–18)
IMM GRANULOCYTES # BLD AUTO: 0.01 THOU/MM3 (ref 0–0.07)
IMM GRANULOCYTES NFR BLD AUTO: 0.2 %
LYMPHOCYTES ABSOLUTE: 1.9 THOU/MM3 (ref 1–4.8)
LYMPHOCYTES NFR BLD AUTO: 41.9 %
MAGNESIUM SERPL-MCNC: 2 MG/DL (ref 1.6–2.6)
MCH RBC QN AUTO: 31.8 PG (ref 26–33)
MCHC RBC AUTO-ENTMCNC: 33.8 GM/DL (ref 32.2–35.5)
MCV RBC AUTO: 93.8 FL (ref 80–94)
MONOCYTES ABSOLUTE: 0.3 THOU/MM3 (ref 0.4–1.3)
MONOCYTES NFR BLD AUTO: 7.4 %
NEUTROPHILS ABSOLUTE: 2.2 THOU/MM3 (ref 1.8–7.7)
NEUTROPHILS NFR BLD AUTO: 47 %
NRBC BLD AUTO-RTO: 0 /100 WBC
PLATELET # BLD AUTO: 167 THOU/MM3 (ref 130–400)
PMV BLD AUTO: 10.9 FL (ref 9.4–12.4)
POTASSIUM SERPL-SCNC: 4.2 MEQ/L (ref 3.5–5.2)
RBC # BLD AUTO: 4.22 MILL/MM3 (ref 4.7–6.1)
SODIUM SERPL-SCNC: 139 MEQ/L (ref 135–145)
VANCOMYCIN SERPL-MCNC: 8.8 UG/ML (ref 10–39.9)
WBC # BLD AUTO: 4.6 THOU/MM3 (ref 4.8–10.8)

## 2025-06-20 PROCEDURE — 85025 COMPLETE CBC W/AUTO DIFF WBC: CPT

## 2025-06-20 PROCEDURE — 6360000002 HC RX W HCPCS

## 2025-06-20 PROCEDURE — 99232 SBSQ HOSP IP/OBS MODERATE 35: CPT | Performed by: INTERNAL MEDICINE

## 2025-06-20 PROCEDURE — 6360000002 HC RX W HCPCS: Performed by: STUDENT IN AN ORGANIZED HEALTH CARE EDUCATION/TRAINING PROGRAM

## 2025-06-20 PROCEDURE — 80202 ASSAY OF VANCOMYCIN: CPT

## 2025-06-20 PROCEDURE — 6370000000 HC RX 637 (ALT 250 FOR IP): Performed by: INTERNAL MEDICINE

## 2025-06-20 PROCEDURE — 6360000002 HC RX W HCPCS: Performed by: OTOLARYNGOLOGY

## 2025-06-20 PROCEDURE — 2580000003 HC RX 258: Performed by: STUDENT IN AN ORGANIZED HEALTH CARE EDUCATION/TRAINING PROGRAM

## 2025-06-20 PROCEDURE — 36415 COLL VENOUS BLD VENIPUNCTURE: CPT

## 2025-06-20 PROCEDURE — 2500000003 HC RX 250 WO HCPCS: Performed by: INTERNAL MEDICINE

## 2025-06-20 PROCEDURE — 6370000000 HC RX 637 (ALT 250 FOR IP): Performed by: OTOLARYNGOLOGY

## 2025-06-20 PROCEDURE — 99232 SBSQ HOSP IP/OBS MODERATE 35: CPT | Performed by: STUDENT IN AN ORGANIZED HEALTH CARE EDUCATION/TRAINING PROGRAM

## 2025-06-20 PROCEDURE — 2580000003 HC RX 258: Performed by: OTOLARYNGOLOGY

## 2025-06-20 PROCEDURE — 2060000000 HC ICU INTERMEDIATE R&B

## 2025-06-20 PROCEDURE — 80048 BASIC METABOLIC PNL TOTAL CA: CPT

## 2025-06-20 PROCEDURE — 83735 ASSAY OF MAGNESIUM: CPT

## 2025-06-20 RX ORDER — KETOROLAC TROMETHAMINE 30 MG/ML
30 INJECTION, SOLUTION INTRAMUSCULAR; INTRAVENOUS EVERY 6 HOURS PRN
Status: DISCONTINUED | OUTPATIENT
Start: 2025-06-20 | End: 2025-06-21 | Stop reason: HOSPADM

## 2025-06-20 RX ORDER — HYDROCODONE BITARTRATE AND ACETAMINOPHEN 5; 217 MG/10ML; MG/10ML
10 SOLUTION ORAL EVERY 4 HOURS PRN
Refills: 0 | Status: DISCONTINUED | OUTPATIENT
Start: 2025-06-20 | End: 2025-06-21 | Stop reason: HOSPADM

## 2025-06-20 RX ADMIN — HYDROMORPHONE HYDROCHLORIDE 1 MG: 1 INJECTION, SOLUTION INTRAMUSCULAR; INTRAVENOUS; SUBCUTANEOUS at 03:36

## 2025-06-20 RX ADMIN — PIPERACILLIN AND TAZOBACTAM 3375 MG: 3; .375 INJECTION, POWDER, FOR SOLUTION INTRAVENOUS at 09:10

## 2025-06-20 RX ADMIN — Medication 1500 MG: at 09:09

## 2025-06-20 RX ADMIN — 0.12% CHLORHEXIDINE GLUCONATE 15 ML: 1.2 RINSE ORAL at 09:06

## 2025-06-20 RX ADMIN — PIPERACILLIN AND TAZOBACTAM 3375 MG: 3; .375 INJECTION, POWDER, FOR SOLUTION INTRAVENOUS at 01:03

## 2025-06-20 RX ADMIN — SODIUM CHLORIDE, PRESERVATIVE FREE 10 ML: 5 INJECTION INTRAVENOUS at 09:07

## 2025-06-20 RX ADMIN — 0.12% CHLORHEXIDINE GLUCONATE 15 ML: 1.2 RINSE ORAL at 20:41

## 2025-06-20 RX ADMIN — 0.12% CHLORHEXIDINE GLUCONATE 15 ML: 1.2 RINSE ORAL at 16:43

## 2025-06-20 RX ADMIN — PIPERACILLIN AND TAZOBACTAM 3375 MG: 3; .375 INJECTION, POWDER, FOR SOLUTION INTRAVENOUS at 16:48

## 2025-06-20 RX ADMIN — HYDROCODONE BITARTRATE AND ACETAMINOPHEN 10 ML: 5; 217 SOLUTION ORAL at 22:46

## 2025-06-20 RX ADMIN — Medication 1500 MG: at 16:44

## 2025-06-20 RX ADMIN — 0.12% CHLORHEXIDINE GLUCONATE 15 ML: 1.2 RINSE ORAL at 13:26

## 2025-06-20 ASSESSMENT — PAIN DESCRIPTION - ORIENTATION
ORIENTATION: RIGHT
ORIENTATION: RIGHT;INNER

## 2025-06-20 ASSESSMENT — PAIN DESCRIPTION - DESCRIPTORS
DESCRIPTORS: SORE
DESCRIPTORS: SHARP;SORE
DESCRIPTORS: SORE;DISCOMFORT
DESCRIPTORS: SORE

## 2025-06-20 ASSESSMENT — PAIN DESCRIPTION - LOCATION
LOCATION: THROAT

## 2025-06-20 ASSESSMENT — PAIN DESCRIPTION - PAIN TYPE
TYPE: SURGICAL PAIN
TYPE: SURGICAL PAIN

## 2025-06-20 ASSESSMENT — PAIN SCALES - GENERAL
PAINLEVEL_OUTOF10: 3
PAINLEVEL_OUTOF10: 3
PAINLEVEL_OUTOF10: 5
PAINLEVEL_OUTOF10: 4

## 2025-06-20 ASSESSMENT — PAIN - FUNCTIONAL ASSESSMENT
PAIN_FUNCTIONAL_ASSESSMENT: ACTIVITIES ARE NOT PREVENTED

## 2025-06-20 NOTE — PLAN OF CARE
Problem: Discharge Planning  Goal: Discharge to home or other facility with appropriate resources  6/20/2025 1104 by Manjula Martell RN  Outcome: Progressing  Flowsheets (Taken 6/20/2025 0119 by Renetta Huff RN)  Discharge to home or other facility with appropriate resources:   Identify barriers to discharge with patient and caregiver   Arrange for needed discharge resources and transportation as appropriate   Identify discharge learning needs (meds, wound care, etc)   Refer to discharge planning if patient needs post-hospital services based on physician order or complex needs related to functional status, cognitive ability or social support system     Problem: Pain  Goal: Verbalizes/displays adequate comfort level or baseline comfort level  6/20/2025 1104 by Manjula Martell RN  Outcome: Progressing  Flowsheets (Taken 6/20/2025 0119 by Renetta Huff RN)  Verbalizes/displays adequate comfort level or baseline comfort level:   Encourage patient to monitor pain and request assistance   Assess pain using appropriate pain scale   Administer analgesics based on type and severity of pain and evaluate response   Implement non-pharmacological measures as appropriate and evaluate response   Notify Licensed Independent Practitioner if interventions unsuccessful or patient reports new pain     Problem: Skin/Tissue Integrity - Adult  Goal: Incisions, wounds, or drain sites healing without S/S of infection  Outcome: Progressing  Flowsheets (Taken 6/20/2025 1104)  Incisions, Wounds, or Drain Sites Healing Without Sign and Symptoms of Infection: ADMISSION and DAILY: Assess and document risk factors for pressure ulcer development     Problem: Gastrointestinal - Adult  Goal: Maintains or returns to baseline bowel function  Outcome: Progressing  Flowsheets (Taken 6/20/2025 1104)  Maintains or returns to baseline bowel function: Assess bowel function     Problem: Infection - Adult  Goal: Absence of infection at discharge  Outcome:

## 2025-06-20 NOTE — CARE COORDINATION
6/20/25, 3:20 PM EDT    DISCHARGE ON GOING EVALUATION    [facility] day: 2  Location: -22/022-A Reason for admit: Tonsillar abscess [J36]  Abscess of multiple sites of head and neck [L02.811, L02.11]     Procedures:   6/18 I&D Tonsil Abscess and intraop bronchochospy with BAL     Imaging since last note: none    Barriers to Discharge: Await cultures. Hospitalist and ID following. IV zosyn. IV vanc.     PCP: Srinivas Adrian MD  Readmission Risk Score: 5.3    Patient Goals/Plan/Treatment Preferences: Plans home with mom, denied needs.

## 2025-06-20 NOTE — PROGRESS NOTES
[facility] Infectious Disease         Progress Note      Johnny Sharma  MEDICAL RECORD NUMBER:  [id number]  AGE: 30 y.o.   GENDER: male  : 1995  EPISODE DATE:  2025      Assessment:     Patient is a 30-year-old male who I am following for a tonsillar abscess with extension to thoracic inlet.    Given suspicion for mediastinitis as well as extent of infection, patient has remained on empiric coverage of vancomycin and piperacillin/tazobactam.  Overall, he has noted significant improvement and no new signs of pain or fever/chills overnight.  I did review cultures with evidence of normal jose which would be consistent with typical strep as well as oral anaerobes.  Okay to continue IV therapy for next 24 hours.  If tolerating well, would be reasonable to transition to ampicillin-sulbactam on  given no evidence of resistant gram-negative, Pseudomonas or MRSA infection.      Subjective:     Chief Complaint   Patient presents with    Sore Throat    Oral Swelling     Throat         No acute events overnight, no new complaints or concerns this morning.  Patient is overall had no significant increase in pain in the chest or difficulty with swallowing.  He denies any fevers or chills overnight.  He is doing well.      Patient Active Problem List   Diagnosis Code    Tonsillar abscess J36    Abscess of multiple sites of head and neck L02.811, L02.11    Difficulty liberating from mechanical ventilation ([facility]) Z99.11    Tonsil, abscess J36    Parapharyngeal abscess J39.0             PAST MEDICAL HISTORY    History reviewed. No pertinent past medical history.    PAST SURGICAL HISTORY    Past Surgical History:   Procedure Laterality Date    MOUTH SURGERY N/A 2025    I&D Tonsil Abscess and intraop bronchochospy performed by Tanvir Gurrola MD at [facility] OR       FAMILY HISTORY    History reviewed. No pertinent family history.    SOCIAL HISTORY    Social History     Tobacco Use    Smoking status:

## 2025-06-20 NOTE — PROGRESS NOTES
[facility]   Pharmacy Pharmacokinetic Monitoring Service - Vancomycin    Indication: tonsillar abscess  Target Concentration: Goal AUC/HERACLIO 400-600 mg*hr/L  Day of Therapy: 3  Additional Antimicrobials: Zosyn (Day 3)     Pertinent Laboratory Values:   Wt Readings from Last 1 Encounters:   06/20/25 90 kg (198 lb 8 oz)     Temp Readings from Last 1 Encounters:   06/20/25 98.1 °F (36.7 °C) (Oral)     Estimated Creatinine Clearance: 148 mL/min (based on SCr of 0.8 mg/dL).  Recent Labs     06/19/25  0341 06/20/25  0330   CREATININE 0.9 0.8   BUN 9 9   WBC 7.7 4.6*     Pertinent Cultures:  Date Source Results   6/18/25 Blood x2 No growth x 24 hours   6/18/25 Tonsil swab Normal jose - prelim   6/18/25 BAL fluid Normal jose - prelim   6/18/25 Tonsil tissue No result   6/18/25 Lung fluid No result   MRSA Nasal Swab: N/A. Non-respiratory infection.    Recent vancomycin administrations                     vancomycin (VANCOCIN) 1500 mg in sodium chloride 0.9 % 250 mL IVPB (mg) 1,500 mg New Bag 06/19/25 2125     1,500 mg New Bag  0844    vancomycin (VANCOCIN) 2,250 mg in sodium chloride 0.9 % 500 mL IVPB (mg) 2,250 mg New Bag 06/18/25 2110                  Assessment:  Date/Time Current Dose Concentration Timing of Concentration AUC C trough,ss   6/20/25 0330 1500 mg q12h 8.8 ~ 6 hr 309 5.5   Note: Serum concentrations collected for AUC dosing may appear elevated if collected in close proximity to the dose administered, this is not necessarily an indication of toxicity    Plan:  Current dosing regimen is sub-therapeutic  Increase dose to 1500 mg IV q8h for estimated AUC of 458 & trough of 11.5  Repeat vancomycin concentration ordered for tomorrow with AM labs  Pharmacy will monitor renal function and adjust therapy as indicated.    Thank you for the consult,  Dorene Starkey, PharmD, BCPS 6/20/2025 7:18 AM

## 2025-06-20 NOTE — PROGRESS NOTES
Assessment and Plan:        Right tonsillar abscess: drained, cultures pending.  On regular diet.  Will stop IV.         CC:  sore throat  HPI:  pt with sore throat x mos, rx with outpt atbs, worsened and presented to ER; tonsillar abscess drained.  Doing well postop.  ROS (12 point review of systems completed.  Pertinent positives noted. Otherwise ROS is negative)   PMH:  Per HPI  SHX:  single, smoker  FHX: Noncontributory    Allergies: See above    Medications:     sodium chloride        vancomycin  1,500 mg IntraVENous Q8H    sodium chloride flush  5-40 mL IntraVENous 2 times per day    chlorhexidine  15 mL Mouth/Throat 4x Daily    piperacillin-tazobactam  3,375 mg IntraVENous Q8H    vancomycin (VANCOCIN) intermittent dosing (placeholder)   Other RX Placeholder       Vital Signs:   /66   Pulse 54   Temp 98.1 °F (36.7 °C) (Oral)   Resp 16   Ht 1.829 m (6')   Wt 90 kg (198 lb 8 oz)   SpO2 97%   BMI 26.92 kg/m²      Intake/Output Summary (Last 24 hours) at 6/20/2025 0724  Last data filed at 6/20/2025 0333  Gross per 24 hour   Intake 2092.52 ml   Output 750 ml   Net 1342.52 ml        Physical Examination: General appearance - alert, well appearing, and in no distress  Mental status - alert, oriented to person, place, and time  Neck - supple, no significant adenopathy, no JVD, or carotid bruits  Throat- some swelling right posterior pharynx; linear incision noted.  Chest - clear to auscultation, no wheezes, rales or rhonchi, symmetric air entry  Heart - normal rate, regular rhythm, normal S1, S2, no murmurs, rubs, clicks or gallops  Abdomen - soft, nontender, nondistended, no masses or organomegaly  Neurological - alert, oriented, normal speech, no focal findings or movement disorder noted  Musculoskeletal - no joint tenderness, deformity or swelling  Extremities - peripheral pulses normal, no pedal edema, no clubbing or cyanosis  Skin - normal coloration and turgor, no rashes, no suspicious skin lesions

## 2025-06-20 NOTE — PLAN OF CARE
Problem: Discharge Planning  Goal: Discharge to home or other facility with appropriate resources  Outcome: Progressing  Flowsheets (Taken 6/20/2025 0119)  Discharge to home or other facility with appropriate resources:   Identify barriers to discharge with patient and caregiver   Arrange for needed discharge resources and transportation as appropriate   Identify discharge learning needs (meds, wound care, etc)   Refer to discharge planning if patient needs post-hospital services based on physician order or complex needs related to functional status, cognitive ability or social support system     Problem: Pain  Goal: Verbalizes/displays adequate comfort level or baseline comfort level  Outcome: Progressing  Flowsheets (Taken 6/20/2025 0119)  Verbalizes/displays adequate comfort level or baseline comfort level:   Encourage patient to monitor pain and request assistance   Assess pain using appropriate pain scale   Administer analgesics based on type and severity of pain and evaluate response   Implement non-pharmacological measures as appropriate and evaluate response   Notify Licensed Independent Practitioner if interventions unsuccessful or patient reports new pain

## 2025-06-21 VITALS
TEMPERATURE: 98.3 F | SYSTOLIC BLOOD PRESSURE: 136 MMHG | OXYGEN SATURATION: 100 % | RESPIRATION RATE: 17 BRPM | HEART RATE: 75 BPM | HEIGHT: 72 IN | BODY MASS INDEX: 26.63 KG/M2 | WEIGHT: 196.6 LBS | DIASTOLIC BLOOD PRESSURE: 72 MMHG

## 2025-06-21 LAB
BACTERIA SPEC AEROBE CULT: NORMAL
BASOPHILS ABSOLUTE: 0 THOU/MM3 (ref 0–0.1)
BASOPHILS NFR BLD AUTO: 0.4 %
DEPRECATED RDW RBC AUTO: 40.7 FL (ref 35–45)
EOSINOPHIL NFR BLD AUTO: 2.9 %
EOSINOPHILS ABSOLUTE: 0.1 THOU/MM3 (ref 0–0.4)
ERYTHROCYTE [DISTWIDTH] IN BLOOD BY AUTOMATED COUNT: 12.1 % (ref 11.5–14.5)
HCT VFR BLD AUTO: 39.2 % (ref 42–52)
HGB BLD-MCNC: 13.7 GM/DL (ref 14–18)
IMM GRANULOCYTES # BLD AUTO: 0.03 THOU/MM3 (ref 0–0.07)
IMM GRANULOCYTES NFR BLD AUTO: 0.6 %
LYMPHOCYTES ABSOLUTE: 1.9 THOU/MM3 (ref 1–4.8)
LYMPHOCYTES NFR BLD AUTO: 39.5 %
MAGNESIUM SERPL-MCNC: 2 MG/DL (ref 1.6–2.6)
MCH RBC QN AUTO: 31.9 PG (ref 26–33)
MCHC RBC AUTO-ENTMCNC: 34.9 GM/DL (ref 32.2–35.5)
MCV RBC AUTO: 91.2 FL (ref 80–94)
MONOCYTES ABSOLUTE: 0.4 THOU/MM3 (ref 0.4–1.3)
MONOCYTES NFR BLD AUTO: 8.3 %
NEUTROPHILS ABSOLUTE: 2.3 THOU/MM3 (ref 1.8–7.7)
NEUTROPHILS NFR BLD AUTO: 48.3 %
NRBC BLD AUTO-RTO: 0 /100 WBC
PLATELET # BLD AUTO: 200 THOU/MM3 (ref 130–400)
PMV BLD AUTO: 10 FL (ref 9.4–12.4)
RBC # BLD AUTO: 4.3 MILL/MM3 (ref 4.7–6.1)
WBC # BLD AUTO: 4.8 THOU/MM3 (ref 4.8–10.8)

## 2025-06-21 PROCEDURE — 2580000003 HC RX 258: Performed by: STUDENT IN AN ORGANIZED HEALTH CARE EDUCATION/TRAINING PROGRAM

## 2025-06-21 PROCEDURE — 83735 ASSAY OF MAGNESIUM: CPT

## 2025-06-21 PROCEDURE — 36415 COLL VENOUS BLD VENIPUNCTURE: CPT

## 2025-06-21 PROCEDURE — 6360000002 HC RX W HCPCS: Performed by: STUDENT IN AN ORGANIZED HEALTH CARE EDUCATION/TRAINING PROGRAM

## 2025-06-21 PROCEDURE — 6370000000 HC RX 637 (ALT 250 FOR IP): Performed by: INTERNAL MEDICINE

## 2025-06-21 PROCEDURE — 2500000003 HC RX 250 WO HCPCS: Performed by: INTERNAL MEDICINE

## 2025-06-21 PROCEDURE — 2580000003 HC RX 258: Performed by: OTOLARYNGOLOGY

## 2025-06-21 PROCEDURE — 99239 HOSP IP/OBS DSCHRG MGMT >30: CPT | Performed by: INTERNAL MEDICINE

## 2025-06-21 PROCEDURE — 6370000000 HC RX 637 (ALT 250 FOR IP): Performed by: OTOLARYNGOLOGY

## 2025-06-21 PROCEDURE — 99232 SBSQ HOSP IP/OBS MODERATE 35: CPT | Performed by: STUDENT IN AN ORGANIZED HEALTH CARE EDUCATION/TRAINING PROGRAM

## 2025-06-21 PROCEDURE — 6360000002 HC RX W HCPCS: Performed by: OTOLARYNGOLOGY

## 2025-06-21 PROCEDURE — 85025 COMPLETE CBC W/AUTO DIFF WBC: CPT

## 2025-06-21 RX ORDER — HYDROCODONE BITARTRATE AND ACETAMINOPHEN 5; 325 MG/1; MG/1
1 TABLET ORAL EVERY 6 HOURS PRN
Qty: 12 TABLET | Refills: 0 | Status: SHIPPED | OUTPATIENT
Start: 2025-06-21 | End: 2025-06-21 | Stop reason: HOSPADM

## 2025-06-21 RX ADMIN — SODIUM CHLORIDE, PRESERVATIVE FREE 10 ML: 5 INJECTION INTRAVENOUS at 09:10

## 2025-06-21 RX ADMIN — PIPERACILLIN AND TAZOBACTAM 3375 MG: 3; .375 INJECTION, POWDER, FOR SOLUTION INTRAVENOUS at 01:21

## 2025-06-21 RX ADMIN — HYDROCODONE BITARTRATE AND ACETAMINOPHEN 10 ML: 5; 217 SOLUTION ORAL at 03:19

## 2025-06-21 RX ADMIN — Medication 1500 MG: at 01:22

## 2025-06-21 RX ADMIN — AMOXICILLIN AND CLAVULANATE POTASSIUM 1 TABLET: 875; 125 TABLET, FILM COATED ORAL at 09:10

## 2025-06-21 RX ADMIN — 0.12% CHLORHEXIDINE GLUCONATE 15 ML: 1.2 RINSE ORAL at 14:11

## 2025-06-21 RX ADMIN — 0.12% CHLORHEXIDINE GLUCONATE 15 ML: 1.2 RINSE ORAL at 09:09

## 2025-06-21 ASSESSMENT — PAIN SCALES - GENERAL
PAINLEVEL_OUTOF10: 0
PAINLEVEL_OUTOF10: 4

## 2025-06-21 ASSESSMENT — PAIN DESCRIPTION - DESCRIPTORS: DESCRIPTORS: SORE;DISCOMFORT

## 2025-06-21 ASSESSMENT — PAIN DESCRIPTION - LOCATION: LOCATION: THROAT

## 2025-06-21 ASSESSMENT — PAIN - FUNCTIONAL ASSESSMENT: PAIN_FUNCTIONAL_ASSESSMENT: ACTIVITIES ARE NOT PREVENTED

## 2025-06-21 ASSESSMENT — PAIN DESCRIPTION - ORIENTATION: ORIENTATION: RIGHT

## 2025-06-21 NOTE — DISCHARGE SUMMARY
Resident Discharge Summary (Hospitalist)      Patient: Johnny Sharma 30 y.o. male  : 1995  MRN: [id number]   Account: [id number]   Patient's PCP: Srinivas Adrian MD    Admit Date: 2025   Discharge Date:   25    Admitting Physician: Arian Spencer MD  Discharge Physician: Raghavendra Alvarez DO       Outpatient Follow-up Rec's:  Outpatient follow-up with ENT/ID within 2 weeks.  Continue with Augmentin for 12 more days, July 3.  If developing fever or worsening of the soreness please consult the ENT or come to the nearest ED for further evaluation.       Hospital Course:   Johnny Sharma is a 30 y.o. male with no significant PMHx admitted to [facility] on 2025 for worsening swelling and trouble swallowing.  CT neck notable for 3.4 cm tonsillar abscess with extension of edema and fluid collection inferior to the right piriform sinus.  S/p I&D on 25 with debridement to the lung with bronchoscopy in the OR.  Patient was admitted to ICU for further monitoring.  Patient remained stable.  Patient was transferred down to stepdown unit on 2025.  Anaerobic cultures, blood cultures, bodily fluid cultures has been negative to date.  Patient was started on vancomycin and Zosyn, ID was consulted, transition to Augmentin with given negative cultures.  Patient will be discharged with 12 more days of Augmentin.  Patient will follow-up with ID and ENT as an outpatient for further management.  Patient advised if having fever/chills or worsening soreness in the neck please present to the nearest hospital.      The patient was seen and examined on day of discharge and this discharge summary is in conjunction with any daily progress note from day of discharge.    The patient is discharged in stable condition.       Exam:   Vitals:  Vitals:    25 0349 25 0903 25 1147 25 1519   BP:  123/70 123/80 136/72   Pulse:  75 89 75   Resp: 14 14 16 17   Temp:  97.8 °F  AUGMENTIN  Take 1 tablet by mouth every 12 hours for 12 days            STOP taking these medications      azithromycin 250 MG tablet  Commonly known as: ZITHROMAX     predniSONE 10 MG tablet  Commonly known as: DELTASONE               Where to Get Your Medications        These medications were sent to [facility] - [place], [place] - [address] - P [phone] - F [phone]  [address] [address]      Phone: [phone]   amoxicillin-clavulanate 875-125 MG per tablet                Time Spent on discharge is 45 minutes in the examination, evaluation, counseling and review of medications and discharge plan.    Thank you Srinivas Adrian MD for the opportunity to be involved in this patient's care.      Signed:    Electronically signed by Raghavendra Alvarez DO on 6/21/25 at 4:34 PM EDT     Case was discussed with Attending, Dr. Adams Chun MD

## 2025-06-21 NOTE — PROGRESS NOTES
[facility]         Progress Note      Johnny Sharma  MEDICAL RECORD NUMBER:  [id number]  AGE: 30 y.o.   GENDER: male  : 1995  EPISODE DATE:  2025      Assessment:     Patient is a 30-year-old male who I am following for a tonsillar abscess with extension to thoracic inlet.    Given suspicion for mediastinitis as well as extent of infection, patient has remained on empiric coverage of vancomycin and piperacillin/tazobactam.  Overall, he has noted significant improvement and no new signs of pain or fever/chills overnight.  I did review cultures with evidence of normal jose which would be consistent with typical strep as well as oral anaerobes.  Okay to continue IV therapy for next 24 hours.  If tolerating well, would be reasonable to transition to ampicillin-sulbactam on  given no evidence of resistant gram-negative, Pseudomonas or MRSA infection.    This patient has shown significant improvement and has no complaints of chest pain or increased difficulty with swallowing.  I would recommend transition to oral therapy with amoxicillin/clavulanate 875 mg twice daily to complete 14 days.  I would also recommend follow-up with infectious disease in 2 weeks.    I have reviewed potential alarm signs and symptoms with patient that would suggest reinfection.  He is aware and will return to the emergency department if these are to occur.      Subjective:     Chief Complaint   Patient presents with    Sore Throat    Oral Swelling     Throat         No acute events overnight, no new complaints or concerns this morning.  He denies any evidence of chest pain or worsening symptoms.    Patient Active Problem List   Diagnosis Code    Tonsillar abscess J36    Abscess of multiple sites of head and neck L02.811, L02.11    Difficulty liberating from mechanical ventilation ([facility]) Z99.11    Tonsil, abscess J36    Parapharyngeal abscess J39.0             PAST MEDICAL HISTORY    History

## 2025-06-21 NOTE — PROGRESS NOTES
06/21/25, 1600: [facility] closed on Sunday. This RN spoke to Dr. Chun. And updated him on pharmacy. Dr. Chun changed pharmacy in computer and [facility].     1630: Discharge instructions reviewed using teach back method. No concerns voiced from patient.

## 2025-06-21 NOTE — PLAN OF CARE
Problem: Discharge Planning  Goal: Discharge to home or other facility with appropriate resources  Outcome: Progressing  Flowsheets (Taken 6/21/2025 0144)  Discharge to home or other facility with appropriate resources:   Identify barriers to discharge with patient and caregiver   Arrange for needed discharge resources and transportation as appropriate   Identify discharge learning needs (meds, wound care, etc)   Refer to discharge planning if patient needs post-hospital services based on physician order or complex needs related to functional status, cognitive ability or social support system     Problem: Pain  Goal: Verbalizes/displays adequate comfort level or baseline comfort level  Outcome: Progressing  Flowsheets (Taken 6/20/2025 0119)  Verbalizes/displays adequate comfort level or baseline comfort level:   Encourage patient to monitor pain and request assistance   Assess pain using appropriate pain scale   Administer analgesics based on type and severity of pain and evaluate response   Implement non-pharmacological measures as appropriate and evaluate response   Notify Licensed Independent Practitioner if interventions unsuccessful or patient reports new pain     Problem: Skin/Tissue Integrity - Adult  Goal: Incisions, wounds, or drain sites healing without S/S of infection  Outcome: Progressing  Flowsheets (Taken 6/21/2025 0144)  Incisions, Wounds, or Drain Sites Healing Without Sign and Symptoms of Infection: ADMISSION and DAILY: Assess and document risk factors for pressure ulcer development     Problem: Gastrointestinal - Adult  Goal: Maintains or returns to baseline bowel function  Outcome: Progressing  Flowsheets (Taken 6/21/2025 0144)  Maintains or returns to baseline bowel function:   Assess bowel function   Encourage mobilization and activity   Encourage oral fluids to ensure adequate hydration     Problem: Infection - Adult  Goal: Absence of infection at discharge  Outcome: Progressing  Flowsheets

## 2025-06-22 LAB
BACTERIA SPEC AEROBE CULT: ABNORMAL
BACTERIA SPEC ANAEROBE CULT: ABNORMAL
ORGANISM: ABNORMAL

## 2025-06-22 NOTE — PROGRESS NOTES
Patient called in and stated he went to [facility] to  prescription, Pharmacist [facility] stated that they didn't have his prescription. I called in Prescription to [facility] for Amoxicillin-Clavulanate 875-125 Mg. Oral. 1 tablet, every 12 hours for 12 days. No refill. Prescribed by Dr. Adams Chun  No other medications phoned in.

## 2025-06-23 LAB
BACTERIA BLD AEROBE CULT: NORMAL
BACTERIA BLD AEROBE CULT: NORMAL

## 2025-07-07 ENCOUNTER — OFFICE VISIT (OUTPATIENT)
Dept: INFECTIOUS DISEASES | Age: 30
End: 2025-07-07
Payer: COMMERCIAL

## 2025-07-07 VITALS
HEART RATE: 97 BPM | SYSTOLIC BLOOD PRESSURE: 114 MMHG | OXYGEN SATURATION: 98 % | TEMPERATURE: 97 F | BODY MASS INDEX: 28.33 KG/M2 | WEIGHT: 209.2 LBS | RESPIRATION RATE: 18 BRPM | HEIGHT: 72 IN | DIASTOLIC BLOOD PRESSURE: 76 MMHG

## 2025-07-07 DIAGNOSIS — Z99.11: ICD-10-CM

## 2025-07-07 DIAGNOSIS — J39.0 PARAPHARYNGEAL ABSCESS: Primary | ICD-10-CM

## 2025-07-07 DIAGNOSIS — L02.11 ABSCESS OF MULTIPLE SITES OF HEAD AND NECK: ICD-10-CM

## 2025-07-07 DIAGNOSIS — J36 TONSILLAR ABSCESS: ICD-10-CM

## 2025-07-07 DIAGNOSIS — L02.811 ABSCESS OF MULTIPLE SITES OF HEAD AND NECK: ICD-10-CM

## 2025-07-07 PROBLEM — S92.314A CLOSED NONDISPLACED FRACTURE OF FIRST RIGHT METATARSAL BONE: Status: ACTIVE | Noted: 2025-07-07

## 2025-07-07 PROCEDURE — 99214 OFFICE O/P EST MOD 30 MIN: CPT | Performed by: STUDENT IN AN ORGANIZED HEALTH CARE EDUCATION/TRAINING PROGRAM

## 2025-07-07 NOTE — PROGRESS NOTES
input(s): \"LACTA\", \"AMYLASE\" in the last 72 hours.  Lactic Acid: No results for input(s): \"LACTA\" in the last 72 hours.  Troponin: No results for input(s): \"CKTOTAL\", \"CKMB\", \"TROPONINI\" in the last 72 hours.  BNP: No results for input(s): \"BNP\" in the last 72 hours.    CULTURES:   UA: No results for input(s): \"SPECGRAV\", \"PHUR\", \"COLORU\", \"CLARITYU\", \"MUCUS\", \"PROTEINU\", \"BLOODU\", \"RBCUA\", \"WBCUA\", \"BACTERIA\", \"NITRU\", \"GLUCOSEU\", \"BILIRUBINUR\", \"UROBILINOGEN\", \"KETUA\", \"LABCAST\", \"LABCASTTY\", \"AMORPHOS\" in the last 72 hours.    Invalid input(s): \"CRYSTALS\"  Micro:   Lab Results   Component Value Date/Time    BC  06/18/2025 06:55 PM     No growth 24 hours. No growth 48 hours. No growth at 5 days         IMAGING:     I have independently interpreted imaging obtained for this patient    Problem list of patient:     Patient Active Problem List   Diagnosis    Tonsillar abscess    Abscess of multiple sites of head and neck    Difficulty liberating from mechanical ventilation (HCC)    Tonsil, abscess    Parapharyngeal abscess    Closed nondisplaced fracture of first right metatarsal bone             Luis Chakraborty MD, MD, FACP 7/7/2025 9:45 AM

## 2025-07-09 ENCOUNTER — OFFICE VISIT (OUTPATIENT)
Dept: ENT CLINIC | Age: 30
End: 2025-07-09

## 2025-07-09 VITALS
OXYGEN SATURATION: 96 % | HEIGHT: 73 IN | BODY MASS INDEX: 27.38 KG/M2 | WEIGHT: 206.6 LBS | TEMPERATURE: 98 F | RESPIRATION RATE: 18 BRPM | HEART RATE: 100 BPM | SYSTOLIC BLOOD PRESSURE: 124 MMHG | DIASTOLIC BLOOD PRESSURE: 62 MMHG

## 2025-07-09 DIAGNOSIS — J39.0 PARAPHARYNGEAL ABSCESS: ICD-10-CM

## 2025-07-09 DIAGNOSIS — J36 TONSILLAR ABSCESS: Primary | ICD-10-CM

## 2025-07-09 NOTE — PROGRESS NOTES
[facility] [place] [facility]  [facility]SE AND [facility]  [address]  [address]  [facility] [address]  Dept: [phone]  Dept Fax: [phone]  Loc: [phone]    Johnny Sharma is a 30 y.o. male who was referred by No ref. provider found for:  Chief Complaint   Patient presents with    Follow-up     Patient is here for a 2 week hospital f/u I & D. Patient states he is doing well no complaints or no pain.        HPI:       History of Present Illness  Johnny Sharma is a 30 y.o. male with a history of a very large right intratonsillar peritonsillar supra tonsillar and parapharyngeal abscess that was dissecting down to the neck base at the time of his incision and drainage.  The patient responded very well to the I&D as well as to IV antibiotics and was in the hospital several days making sure that the process of extension into the neck was not going to produce the need for an open drainage through the neck.  He did not need that.  He reports doing very well following his procedure and having no vestige of problems from the operation.  He understands he needs to be set up for a tonsillectomy sometime in the relatively near future.        History:     No Known Allergies  No current outpatient medications on file.     No current facility-administered medications for this visit.     History reviewed. No pertinent past medical history.   Past Surgical History:   Procedure Laterality Date    MOUTH SURGERY N/A 6/18/2025    I&D Tonsil Abscess and intraop bronchochospy performed by Tanvir Gurrola MD at [facility] OR     History reviewed. No pertinent family history.  Social History     Tobacco Use    Smoking status: Former     Types: Cigarettes     Start date: 2010    Smokeless tobacco: Never   Substance Use Topics    Alcohol use: Yes        Subjective:      Review of Systems  Rest of review of systems are negative, except as noted in HPI.     Objective:     /62 (BP Site: Right Upper

## (undated) DEVICE — 450 ML BOTTLE OF 0.05% CHLORHEXIDINE GLUCONATE IN 99.95% STERILE WATER FOR IRRIGATION, USP AND APPLICATOR.: Brand: IRRISEPT ANTIMICROBIAL WOUND LAVAGE

## (undated) DEVICE — GLOVE SURG SZ 7.5 L11.73IN FNGR THK9.8MIL STRW LTX POLYMER

## (undated) DEVICE — COLLECTOR SPEC RAYON LIQ STUART DBL FOR THRT VAG SKIN WND

## (undated) DEVICE — GOWN,SIRUS,NONRNF,SETINSLV,XL,20/CS: Brand: MEDLINE

## (undated) DEVICE — T&A: Brand: MEDLINE INDUSTRIES, INC.

## (undated) DEVICE — NEEDLE SPNL L3.5IN DIA25GA QNCKE TYP BVL SPINOCAN

## (undated) DEVICE — SYRINGE MED 10ML TRNSLUC BRL PLUNG BLK MRK POLYPR CTRL

## (undated) DEVICE — BLADE,CARBON-STEEL,12,STRL,DISPOSABLE: Brand: MEDLINE

## (undated) DEVICE — GAUZE,SPONGE,8"X4",12PLY,XRAY,STRL,LF: Brand: MEDLINE

## (undated) DEVICE — FLEXIBLE ENDOSCOPE AND SPECIMEN SAMPLING SYSTEM: Brand: ASCOPE™ 5 BRONCHO HD 5.6/2.8 SAMPLER SET

## (undated) DEVICE — PACK-MAJOR